# Patient Record
Sex: MALE | Race: WHITE | Employment: OTHER | URBAN - METROPOLITAN AREA
[De-identification: names, ages, dates, MRNs, and addresses within clinical notes are randomized per-mention and may not be internally consistent; named-entity substitution may affect disease eponyms.]

---

## 2017-04-10 ENCOUNTER — OFFICE VISIT (OUTPATIENT)
Dept: OTOLARYNGOLOGY | Facility: CLINIC | Age: 67
End: 2017-04-10

## 2017-04-10 VITALS — WEIGHT: 205 LBS | BODY MASS INDEX: 30.36 KG/M2 | HEIGHT: 69 IN

## 2017-04-10 DIAGNOSIS — C32.0 CANCER OF GLOTTIS (H): Primary | ICD-10-CM

## 2017-04-10 RX ORDER — CODEINE PHOSPHATE AND GUAIFENESIN 10; 100 MG/5ML; MG/5ML
1-2 SOLUTION ORAL EVERY 4 HOURS PRN
Qty: 250 ML | Refills: 1 | Status: SHIPPED | OUTPATIENT
Start: 2017-04-10 | End: 2017-12-18

## 2017-04-10 ASSESSMENT — PAIN SCALES - GENERAL: PAINLEVEL: NO PAIN (0)

## 2017-04-10 NOTE — PROGRESS NOTES
HISTORY OF PRESENT ILLNESS:  Mr. Garvin returns.  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  I did take him back to the operating room a couple times to laser away some granulation tissue at the anterior aspect of the closure.  Since that time, he has been doing fairly well.  His breathing seems to be stable although he does get short of breath when he is doing activities outside.  His wife says that he is not a complainer but she can see that he is getting shortness of breath when he does these things.  He denies any trouble swallowing and has no pain.      PHYSICAL EXAMINATION:  His weight is 205 pounds, up 1 pound from his last visit.  Examination of the oral cavity reveals normal mucosa of the tongue, floor of mouth, tongue base, hard and soft palate, gingival and buccal mucosa.  Palpation of floor of mouth, tongue and tongue base are normal.  He cannot tolerate mirror exam.  Examination of the neck reveals a prior surgery and radiation changes but no evidence of any lymphadenopathy.      PROCEDURE:  Flexible endoscopy is performed through the left nasal cavity after anesthetization and vasoconstriction using Collin-Synephrine and Xylocaine.  This reveals a normal nasopharynx, oropharynx and hypopharynx.  In viewing the larynx, again he has a horizontal slit-like opening for his glottis and the arytenoids are canted slightly forward.  As I tried to pass the scope into the subglottis, he coughs and I am unable to see this area.  The airway is moderately narrowed.      IMPRESSION:  No evidence of disease.      PLAN:  I would like the patient to meet with Dr. Cool to see if there are any in-office laser procedures he might be able to employ that might be able to open up his airway a little bit.  My concern is that if I take him to the operating room and put a breathing tube in, it will be difficult to access the  area that I actually need to laser so we will see if we can get an opinion from Dr. Cool. I will see the patient back in 6-8 months for oncologic surveillance.      cc: Prince Ring MD    [facility]    [address] [place]   [address]

## 2017-04-10 NOTE — LETTER
4/10/2017       RE: Goldy Garvin  [address]  [facility] [address]     Dear Colleague,    Thank you for referring your patient, Goldy Garvin, to the [facility] EAR NOSE AND THROAT at [facility]. Please see a copy of my visit note below.    HISTORY OF PRESENT ILLNESS:  Mr. Gravin returns.  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  I did take him back to the operating room a couple times to laser away some granulation tissue at the anterior aspect of the closure.  Since that time, he has been doing fairly well.  His breathing seems to be stable although he does get short of breath when he is doing activities outside.  His wife says that he is not a complainer but she can see that he is getting shortness of breath when he does these things.  He denies any trouble swallowing and has no pain.      PHYSICAL EXAMINATION:  His weight is 205 pounds, up 1 pound from his last visit.  Examination of the oral cavity reveals normal mucosa of the tongue, floor of mouth, tongue base, hard and soft palate, gingival and buccal mucosa.  Palpation of floor of mouth, tongue and tongue base are normal.  He cannot tolerate mirror exam.  Examination of the neck reveals a prior surgery and radiation changes but no evidence of any lymphadenopathy.      PROCEDURE:  Flexible endoscopy is performed through the left nasal cavity after anesthetization and vasoconstriction using Collin-Synephrine and Xylocaine.  This reveals a normal nasopharynx, oropharynx and hypopharynx.  In viewing the larynx, again he has a horizontal slit-like opening for his glottis and the arytenoids are canted slightly forward.  As I tried to pass the scope into the subglottis, he coughs and I am unable to see this area.  The airway is moderately narrowed.      IMPRESSION:  No evidence of disease.      PLAN:  I would like the patient  to meet with Dr. Cool to see if there are any in-office laser procedures he might be able to employ that might be able to open up his airway a little bit.  My concern is that if I take him to the operating room and put a breathing tube in, it will be difficult to access the area that I actually need to laser so we will see if we can get an opinion from Dr. Cool. I will see the patient back in 6-8 months for oncologic surveillance.      Again, thank you for allowing me to participate in the care of your patient.      Sincerely,    Trav Machado MD      cc: Prince Ring MD    [facility]    [address] [place]   [address]

## 2017-04-10 NOTE — MR AVS SNAPSHOT
After Visit Summary   4/10/2017    Goldy Garvin    MRN: [id number]           Patient Information     Date Of Birth          1950        Visit Information        Provider Department      4/10/2017 11:00 AM Trav Machado MD [facility] Ear Nose and Throat        Today's Diagnoses     Cancer of glottis (H)    -  1      Care Instructions    Appointment to be made with Dr. Cool  Follow up with Dr. Machado in 6 months  Call me If ?'s arise [phone]  Alyssa Nicole RN        Follow-ups after your visit        Who to contact     Please call your clinic at [phone] to:    Ask questions about your health    Make or cancel appointments    Discuss your medicines    Learn about your test results    Speak to your doctor   If you have compliments or concerns about an experience at your clinic, or if you wish to file a complaint, please contact [facility] Patient Relations at [phone] or email us at [email or web address]         Additional Information About Your Visit        MyChart Information     [employer] gives you secure access to your electronic health record. If you see a primary care provider, you can also send messages to your care team and make appointments. If you have questions, please call your primary care clinic.  If you do not have a primary care provider, please call [phone] and they will assist you.      [employer] is an electronic gateway that provides easy, online access to your medical records. With [employer], you can request a clinic appointment, read your test results, renew a prescription or communicate with your care team.     To access your existing account, please contact your [facility] or call [phone] for assistance.        Care EveryWhere ID     This is your Care EveryWhere ID. This could be used by other organizations to access your [place] medical records  [id number]        Your Vitals Were      "Height BMI (Body Mass Index)                1.75 m (5' 8.9\") 30.36 kg/m2           Blood Pressure from Last 3 Encounters:   03/20/14 138/80   09/24/13 130/84   08/06/13 138/62    Weight from Last 3 Encounters:   04/13/17 93.9 kg (207 lb)   04/10/17 93 kg (205 lb)   08/08/16 92.5 kg (204 lb)              We Performed the Following     LARYNGOSCOPY FLEX FIBEROPTIC, DIAGNOSTIC          Where to get your medicines      Some of these will need a paper prescription and others can be bought over the counter.  Ask your nurse if you have questions.     Bring a paper prescription for each of these medications     guaiFENesin-codeine 100-10 MG/5ML Soln solution          Primary Care Provider Office Phone # Fax #    Prince Ring [phone]       [facility] [address] [place] [address]        Thank you!     Thank you for choosing [facility] EAR NOSE AND THROAT  for your care. Our goal is always to provide you with excellent care. Hearing back from our patients is one way we can continue to improve our services. Please take a few minutes to complete the written survey that you may receive in the mail after your visit with us. Thank you!             Your Updated Medication List - Protect others around you: Learn how to safely use, store and throw away your medicines at www.disposemymeds.org.          This list is accurate as of: 4/10/17 11:59 PM.  Always use your most recent med list.                   Brand Name Dispense Instructions for use    ASPIRIN PO      Take 325 mg by mouth daily       atorvastatin 80 MG tablet    LIPITOR     40 mg by Per Feeding Tube route At Bedtime       guaiFENesin-codeine 100-10 MG/5ML Soln solution    CHERATUSSIN AC    250 mL    Take 5-10 mLs by mouth every 4 hours as needed for cough       LISINOPRIL PO      Take 10 mg by mouth daily       MULTI-VITAMINS PO      Take by mouth daily       POTASSIUM CHLORIDE ER PO      Take by mouth daily       PRILOSEC PO "

## 2017-04-10 NOTE — PATIENT INSTRUCTIONS
Appointment to be made with Dr. Cool  Follow up with Dr. Machado in 6 months  Call me If ?'s arise [phone]  Alyssa Nicole RN

## 2017-04-13 ENCOUNTER — OFFICE VISIT (OUTPATIENT)
Dept: OTOLARYNGOLOGY | Facility: CLINIC | Age: 67
End: 2017-04-13

## 2017-04-13 VITALS — HEIGHT: 68 IN | WEIGHT: 207 LBS | BODY MASS INDEX: 31.37 KG/M2

## 2017-04-13 DIAGNOSIS — R49.0 DYSPHONIA: Primary | ICD-10-CM

## 2017-04-13 RX ORDER — AMLODIPINE BESYLATE 2.5 MG/1
2.5 TABLET ORAL
COMMUNITY
Start: 2017-04-10

## 2017-04-13 RX ORDER — NITROGLYCERIN 0.4 MG/1
0.4 TABLET SUBLINGUAL
COMMUNITY
Start: 2017-04-10 | End: 2018-04-10

## 2017-04-13 ASSESSMENT — PAIN SCALES - GENERAL: PAINLEVEL: NO PAIN (0)

## 2017-04-13 NOTE — NURSING NOTE
Chief Complaint   Patient presents with     Consult     New Check airway, cancer of glottis      Pt states no pain today.    N Javier KELLYN

## 2017-04-13 NOTE — MR AVS SNAPSHOT
"              After Visit Summary   4/13/2017    Goldy Garvin    MRN: [id number]           Patient Information     Date Of Birth          1950        Visit Information        Provider Department      4/13/2017 3:30 PM Srikanth Cool MD [facility] Ear Nose and Throat        Today's Diagnoses     Dysphonia    -  1       Follow-ups after your visit        Follow-up notes from your care team     Return if symptoms worsen or fail to improve.      Who to contact     Please call your clinic at [phone] to:    Ask questions about your health    Make or cancel appointments    Discuss your medicines    Learn about your test results    Speak to your doctor   If you have compliments or concerns about an experience at your clinic, or if you wish to file a complaint, please contact [facility] Patient Relations at [phone] or email us at [email or web address]         Additional Information About Your Visit        MyChart Information     [email or web address] gives you secure access to your electronic health record. If you see a primary care provider, you can also send messages to your care team and make appointments. If you have questions, please call your primary care clinic.  If you do not have a primary care provider, please call [phone] and they will assist you.      [employer] is an electronic gateway that provides easy, online access to your medical records. With [employer], you can request a clinic appointment, read your test results, renew a prescription or communicate with your care team.     To access your existing account, please contact your [facility] or call [phone] for assistance.        Care EveryWhere ID     This is your Care EveryWhere ID. This could be used by other organizations to access your [place] medical records  [id number]        Your Vitals Were     Height BMI (Body Mass Index)                1.735 m (5' 8.31\") 31.19 " kg/m2           Blood Pressure from Last 3 Encounters:   03/20/14 138/80   09/24/13 130/84   08/06/13 138/62    Weight from Last 3 Encounters:   04/13/17 93.9 kg (207 lb)   04/10/17 93 kg (205 lb)   08/08/16 92.5 kg (204 lb)              We Performed the Following     IMAGESTREAM RECORDING ORDER     LARYNGOSCOPY FLEX FIBEROPTIC, DIAGNOSTIC        Primary Care Provider Office Phone # Fax #    Prince Ring [phone]       [facility] [address] [place] [address]        Thank you!     Thank you for choosing [facility] EAR NOSE AND THROAT  for your care. Our goal is always to provide you with excellent care. Hearing back from our patients is one way we can continue to improve our services. Please take a few minutes to complete the written survey that you may receive in the mail after your visit with us. Thank you!             Your Updated Medication List - Protect others around you: Learn how to safely use, store and throw away your medicines at www.disposemymeds.org.          This list is accurate as of: 4/13/17 11:59 PM.  Always use your most recent med list.                   Brand Name Dispense Instructions for use    amLODIPine 2.5 MG tablet    NORVASC     Take 2.5 mg by mouth       ASPIRIN PO      Take 325 mg by mouth daily       atorvastatin 80 MG tablet    LIPITOR     40 mg by Per Feeding Tube route At Bedtime       guaiFENesin-codeine 100-10 MG/5ML Soln solution    CHERATUSSIN AC    250 mL    Take 5-10 mLs by mouth every 4 hours as needed for cough       LISINOPRIL PO      Take 10 mg by mouth daily       MULTI-VITAMINS PO      Take by mouth daily       nitroglycerin 0.4 MG sublingual tablet    NITROSTAT     Place 0.4 mg under the tongue       POTASSIUM CHLORIDE ER PO      Take by mouth daily       PRILOSEC PO

## 2017-04-13 NOTE — PROGRESS NOTES
HISTORY OF PRESENT ILLNESS:  Mr. Garvin is a patient of Dr. Machado.  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  He has been taken to the operating room twice to laser out granulation tissue at the anterior aspect of the closure.  His breathing seems to be stable.  He is able to walk up a flight of stairs and he is quite active.  He notes that he will have times when he is short of breath and will have mild difficulty breathing, but this is only with exertion rather than rest.  Again, he is quite active.  He has no swallowing difficulties and his voice is consistent with his surgery.         Last 2 Scores for Patient-Answered VHI Questionnaire  VHI Total Score 4/13/2017   VHI Total Score 14       Last 2 Scores for Patient-Answered CSI Questionnaire  CSI Total Score 4/13/2017   CSI Total Score 5         Last 2 Scores for Patient-Answered EAT Questionnaire  EAT Total Score 4/13/2017   EAT Total Score 8           PAST MEDICAL HISTORY:   Past Medical History:   Diagnosis Date     Arthritis     in hands     COPD (chronic obstructive pulmonary disease) (H)      Coronary artery disease      Gastro-oesophageal reflux disease      Heart attack (H) 6/2002     History of radiation therapy      Hypertension      Migraines      Tinnitus        PAST SURGICAL HISTORY:   Past Surgical History:   Procedure Laterality Date     BIOPSY  3/2013    larynx     CARDIAC SURGERY      stent placed     COLONOSCOPY      2012     HERNIA REPAIR       LARYNGECTOMY  4/30/2013    Procedure: LARYNGECTOMY;  Direct Laryngoscopy with Biopsies, Supra-Cricoid with Tracheostomy Tube placement and 12fr nasal feeding tube placement;  Surgeon: Trav Machado MD;  Location: [facility]     LARYNGOSCOPY WITH BIOPSY(IES)  4/30/2013    Procedure: LARYNGOSCOPY WITH BIOPSY(IES);;  Surgeon: Trav Machado MD;  Location: [facility] OR     LASER CO2 LARYNGOSCOPY   4/18/2013    Procedure: LASER CO2 LARYNGOSCOPY;  Direct Laryngosocpy, Co2 Excison Laryngeal Lesion , with Biopsies;  Surgeon: Trav Machado MD;  Location: [facility] OR     LASER CO2 LARYNGOSCOPY  8/6/2013    Procedure: LASER CO2 LARYNGOSCOPY;  Suspension Micro Laryngoscopy With CO2 Laser Of Laryngeal Tissue ;  Surgeon: Trav Machado MD;  Location: [facility] OR     LASER CO2 LARYNGOSCOPY  9/24/2013    Procedure: LASER CO2 LARYNGOSCOPY;  Direct Laryngoscopy with Microscope, Co2 Laser Excision Tracheal Granulation Tissue;  Surgeon: Trav Machado MD;  Location: [facility] OR     LASER CO2 LARYNGOSCOPY, COMPLEX  3/20/2014    Procedure: LASER CO2 LARYNGOSCOPY, COMPLEX;  Suspension Micro Laryngoscopy, Diagnostic Laryngoscopy, Co2 Laser Excision of Laryngeal Lesions, Mitomycin application;  Surgeon: Trav Machado MD;  Location: [facility] OR       FAMILY HISTORY:   Family History   Problem Relation Age of Onset     CANCER Mother      DIABETES Mother      DIABETES Sister      HEART DISEASE Father      HEART DISEASE Sister      Hypertension Father      Hypertension Sister      CEREBROVASCULAR DISEASE Father      Migraines Sister        SOCIAL HISTORY:   Social History   Substance Use Topics     Smoking status: Former Smoker     Packs/day: 0.50     Years: 5.00     Types: Cigarettes, Cigars     Quit date: 1/18/1975     Smokeless tobacco: Never Used     Alcohol use Yes      Comment: very little       REVIEW OF SYSTEMS: Ten point review of systems was performed and is negative except for:   [facility] E[facility] ROS 4/13/2017   Constitutional Appetite change, Unexplained fatigue   Ears, Nose, Throat Ringing/noise in ears, Trouble swallowing   Cardiopulmonary Breathing problems, Wheezing   Gastrointestinal/Genitourinary Heartburn/indigestion   Musculoskeletal Back pain   Allergy/Immunology Allergies or hay fever        ALLERGIES: Seafood; Blueberry flavor; Clam shell; and Amoxicillin    MEDICATIONS:   Current Outpatient Prescriptions   Medication Sig  Dispense Refill     nitroglycerin (NITROSTAT) 0.4 MG sublingual tablet Place 0.4 mg under the tongue       amLODIPine (NORVASC) 2.5 MG tablet Take 2.5 mg by mouth       guaiFENesin-codeine (CHERATUSSIN AC) 100-10 MG/5ML SOLN solution Take 5-10 mLs by mouth every 4 hours as needed for cough 250 mL 1     LISINOPRIL PO Take 10 mg by mouth daily       Multiple Vitamin (MULTI-VITAMINS PO) Take by mouth daily       Omeprazole (PRILOSEC PO)        POTASSIUM CHLORIDE ER PO Take by mouth daily       ASPIRIN PO Take 325 mg by mouth daily       atorvastatin (LIPITOR) 80 MG tablet 40 mg by Per Feeding Tube route At Bedtime                  PHYSICAL EXAMINATION:  He is awake, alert, in no apparent distress.  Tympanic membranes intact bilaterally.  Nasal exam shows a mild septal deviation, without obstruction.  Examination of the oral cavity shows no suspicious lesions.  His neck shows a well-healed surgical incision and radiation changes but no significant adenopathy.  Pulse is regular.  Upper airway is clear.  Cranial nerves II-XII are grossly intact.          Fiberoptic laryngoscopy was performed following topical anesthesia with 3% lidocaine and 0.25% phenylephrine.  Scope was passed through the right nostril.  This showed post-surgical changes, therefore, a coronal orientation of his glottic airway is present.  There appears to be an adequate airway, epiglottis and arytenoid structures are intact.  With deep inspiration, there is intermittent narrowing of the airway and very brief collapse of the epiglottis against the arytenoids resulting in a decreased airway.  This quickly corrects.  However, when working on abdominal breathing and relaxation of the larynx he does have subjectively improved airflow and less collapse of his supraglottic larynx.  No granulation tissue, no ulcerations are seen.  The immediate subglottic area appears quite clear.  The hypopharynx is poorly seen because of collapse of the pharyngeal wall, but  no suspicious lesions are present.      IMPRESSION AND PLAN:  He does have an adequate airway for moderate activity.  I believe with some breathing exercises he could improve his activity tolerance which would be helpful as he is quite active at home. He wished to work on this on his own for now.  He was given a photograph of his airway, both in the open and closed positions.  All questions were answered.  If they would like to have a session with our therapist they can call us and we will help arrange this for them.        cc: Prince Armstrong MD           [facility]          [address] [place] [address]                     Trav Machado MD           [facility] [address]              GG/lz         Last 2 Scores for Patient-Answered VHI Questionnaire  VHI Total Score 4/13/2017   VHI Total Score 14       Last 2 Scores for Patient-Answered CSI Questionnaire  CSI Total Score 4/13/2017   CSI Total Score 5         Last 2 Scores for Patient-Answered EAT Questionnaire  EAT Total Score 4/13/2017   EAT Total Score 8           PAST MEDICAL HISTORY:   Past Medical History:   Diagnosis Date     Arthritis     in hands     COPD (chronic obstructive pulmonary disease) (H)      Coronary artery disease      Gastro-oesophageal reflux disease      Heart attack (H) 6/2002     History of radiation therapy      Hypertension      Migraines      Tinnitus        PAST SURGICAL HISTORY:   Past Surgical History:   Procedure Laterality Date     BIOPSY  3/2013    larynx     CARDIAC SURGERY      stent placed     COLONOSCOPY      2012     HERNIA REPAIR       LARYNGECTOMY  4/30/2013    Procedure: LARYNGECTOMY;  Direct Laryngoscopy with Biopsies, Supra-Cricoid with Tracheostomy Tube placement and 12fr nasal feeding tube placement;  Surgeon: Trav Machado MD;  Location:  OR     LARYNGOSCOPY WITH BIOPSY(IES)  4/30/2013    Procedure: LARYNGOSCOPY WITH BIOPSY(IES);;  Surgeon: Trav Machado MD;  Location:   OR     LASER CO2 LARYNGOSCOPY  4/18/2013    Procedure: LASER CO2 LARYNGOSCOPY;  Direct Laryngosocpy, Co2 Excison Laryngeal Lesion , with Biopsies;  Surgeon: Trav Machado MD;  [facility]     LASER CO2 LARYNGOSCOPY  8/6/2013    Procedure: LASER CO2 LARYNGOSCOPY;  Suspension Micro Laryngoscopy With CO2 Laser Of Laryngeal Tissue ;  Surgeon: Trav Machado MD;  [facility]     LASER CO2 LARYNGOSCOPY  9/24/2013    Procedure: LASER CO2 LARYNGOSCOPY;  Direct Laryngoscopy with Microscope, Co2 Laser Excision Tracheal Granulation Tissue;  Surgeon: Trav Machado MD;  [facility]: [facility]     LASER CO2 LARYNGOSCOPY, COMPLEX  3/20/2014    Procedure: LASER CO2 LARYNGOSCOPY, COMPLEX;  Suspension Micro Laryngoscopy, Diagnostic Laryngoscopy, Co2 Laser Excision of Laryngeal Lesions, Mitomycin application;  Surgeon: Trav Machado MD;  Location: [facility]       FAMILY HISTORY:   Family History   Problem Relation Age of Onset     CANCER Mother      DIABETES Mother      DIABETES Sister      HEART DISEASE Father      HEART DISEASE Sister      Hypertension Father      Hypertension Sister      CEREBROVASCULAR DISEASE Father      Migraines Sister        SOCIAL HISTORY:   Social History   Substance Use Topics     Smoking status: Former Smoker     Packs/day: 0.50     Years: 5.00     Types: Cigarettes, Cigars     Quit date: 1/18/1975     Smokeless tobacco: Never Used     Alcohol use Yes      Comment: very little       REVIEW OF SYSTEMS: Ten point review of systems was performed and is negative except for:   [facility] E[facility] ROS 4/13/2017   Constitutional Appetite change, Unexplained fatigue   Ears, Nose, Throat Ringing/noise in ears, Trouble swallowing   Cardiopulmonary Breathing problems, Wheezing   Gastrointestinal/Genitourinary Heartburn/indigestion   Musculoskeletal Back pain   Allergy/Immunology Allergies or hay fever        ALLERGIES: Seafood; Blueberry flavor; Clam shell; and Amoxicillin    MEDICATIONS:   Current Outpatient  Prescriptions   Medication Sig Dispense Refill     nitroglycerin (NITROSTAT) 0.4 MG sublingual tablet Place 0.4 mg under the tongue       amLODIPine (NORVASC) 2.5 MG tablet Take 2.5 mg by mouth       guaiFENesin-codeine (CHERATUSSIN AC) 100-10 MG/5ML SOLN solution Take 5-10 mLs by mouth every 4 hours as needed for cough 250 mL 1     LISINOPRIL PO Take 10 mg by mouth daily       Multiple Vitamin (MULTI-VITAMINS PO) Take by mouth daily       Omeprazole (PRILOSEC PO)        POTASSIUM CHLORIDE ER PO Take by mouth daily       ASPIRIN PO Take 325 mg by mouth daily       atorvastatin (LIPITOR) 80 MG tablet 40 mg by Per Feeding Tube route At Bedtime             Open, relaxed position        Most extreme closed position.  Occurs for a very brief time.

## 2017-04-13 NOTE — LETTER
4/13/2017       RE: Goldy Garvin  [address]  [facility] [address]     Dear Colleague,    Thank you for referring your patient, Goldy Garvin, to the [facility] EAR NOSE AND THROAT at [facility]. Please see a copy of my visit note below.    HISTORY OF PRESENT ILLNESS:  Mr. Garvin is a patient of Dr. Machado.  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  He has been taken to the operating room twice to laser out granulation tissue at the anterior aspect of the closure.  His breathing seems to be stable.  He is able to walk up a flight of stairs and he is quite active.  He notes that he will have times when he is short of breath and will have mild difficulty breathing, but this is only with exertion rather than rest.  Again, he is quite active.  He has no swallowing difficulties and his voice is consistent with his surgery.         Last 2 Scores for Patient-Answered VHI Questionnaire  VHI Total Score 4/13/2017   VHI Total Score 14       Last 2 Scores for Patient-Answered CSI Questionnaire  CSI Total Score 4/13/2017   CSI Total Score 5         Last 2 Scores for Patient-Answered EAT Questionnaire  EAT Total Score 4/13/2017   EAT Total Score 8           PAST MEDICAL HISTORY:   Past Medical History:   Diagnosis Date     Arthritis     in hands     COPD (chronic obstructive pulmonary disease) (H)      Coronary artery disease      Gastro-oesophageal reflux disease      Heart attack (H) 6/2002     History of radiation therapy      Hypertension      Migraines      Tinnitus        PAST SURGICAL HISTORY:   Past Surgical History:   Procedure Laterality Date     BIOPSY  3/2013    larynx     CARDIAC SURGERY      stent placed     COLONOSCOPY      2012     HERNIA REPAIR       LARYNGECTOMY  4/30/2013    Procedure: LARYNGECTOMY;  Direct Laryngoscopy with Biopsies, Supra-Cricoid with Tracheostomy Tube  placement and 12fr nasal feeding tube placement;  Surgeon: Trav Machado MD;  [facility]     LARYNGOSCOPY WITH BIOPSY(IES)  4/30/2013    Procedure: LARYNGOSCOPY WITH BIOPSY(IES);;  Surgeon: Trav Machado MD;  [facility]     LASER CO2 LARYNGOSCOPY  4/18/2013    Procedure: LASER CO2 LARYNGOSCOPY;  Direct Laryngosocpy, Co2 Excison Laryngeal Lesion , with Biopsies;  Surgeon: Trav Machado MD;  [facility]     LASER CO2 LARYNGOSCOPY  8/6/2013    Procedure: LASER CO2 LARYNGOSCOPY;  Suspension Micro Laryngoscopy With CO2 Laser Of Laryngeal Tissue ;  Surgeon: Trav Machado MD;  [facility]     LASER CO2 LARYNGOSCOPY  9/24/2013    Procedure: LASER CO2 LARYNGOSCOPY;  Direct Laryngoscopy with Microscope, Co2 Laser Excision Tracheal Granulation Tissue;  Surgeon: Trav Machado MD;  [facility]     LASER CO2 LARYNGOSCOPY, COMPLEX  3/20/2014    Procedure: LASER CO2 LARYNGOSCOPY, COMPLEX;  Suspension Micro Laryngoscopy, Diagnostic Laryngoscopy, Co2 Laser Excision of Laryngeal Lesions, Mitomycin application;  Surgeon: Trav Machado MD;  [facility]       FAMILY HISTORY:   Family History   Problem Relation Age of Onset     CANCER Mother      DIABETES Mother      DIABETES Sister      HEART DISEASE Father      HEART DISEASE Sister      Hypertension Father      Hypertension Sister      CEREBROVASCULAR DISEASE Father      Migraines Sister        SOCIAL HISTORY:   Social History   Substance Use Topics     Smoking status: Former Smoker     Packs/day: 0.50     Years: 5.00     Types: Cigarettes, Cigars     Quit date: 1/18/1975     Smokeless tobacco: Never Used     Alcohol use Yes      Comment: very little       REVIEW OF SYSTEMS: Ten point review of systems was performed and is negative except for:   [facility] ROS 4/13/2017   Constitutional Appetite change, Unexplained fatigue   Ears, Nose, Throat Ringing/noise in ears, Trouble swallowing   Cardiopulmonary Breathing problems,  Wheezing   Gastrointestinal/Genitourinary Heartburn/indigestion   Musculoskeletal Back pain   Allergy/Immunology Allergies or hay fever        ALLERGIES: Seafood; Blueberry flavor; Clam shell; and Amoxicillin    MEDICATIONS:   Current Outpatient Prescriptions   Medication Sig Dispense Refill     nitroglycerin (NITROSTAT) 0.4 MG sublingual tablet Place 0.4 mg under the tongue       amLODIPine (NORVASC) 2.5 MG tablet Take 2.5 mg by mouth       guaiFENesin-codeine (CHERATUSSIN AC) 100-10 MG/5ML SOLN solution Take 5-10 mLs by mouth every 4 hours as needed for cough 250 mL 1     LISINOPRIL PO Take 10 mg by mouth daily       Multiple Vitamin (MULTI-VITAMINS PO) Take by mouth daily       Omeprazole (PRILOSEC PO)        POTASSIUM CHLORIDE ER PO Take by mouth daily       ASPIRIN PO Take 325 mg by mouth daily       atorvastatin (LIPITOR) 80 MG tablet 40 mg by Per Feeding Tube route At Bedtime                  PHYSICAL EXAMINATION:  He is awake, alert, in no apparent distress.  Tympanic membranes intact bilaterally.  Nasal exam shows a mild septal deviation, without obstruction.  Examination of the oral cavity shows no suspicious lesions.  His neck shows a well-healed surgical incision and radiation changes but no significant adenopathy.  Pulse is regular.  Upper airway is clear.  Cranial nerves II-XII are grossly intact.          Fiberoptic laryngoscopy was performed following topical anesthesia with 3% lidocaine and 0.25% phenylephrine.  Scope was passed through the right nostril.  This showed post-surgical changes, therefore, a coronal orientation of his glottic airway is present.  There appears to be an adequate airway, epiglottis and arytenoid structures are intact.  With deep inspiration, there is intermittent narrowing of the airway and very brief collapse of the epiglottis against the arytenoids resulting in a decreased airway.  This quickly corrects.  However, when working on abdominal breathing and relaxation of the  larynx he does have subjectively improved airflow and less collapse of his supraglottic larynx.  No granulation tissue, no ulcerations are seen.  The immediate subglottic area appears quite clear.  The hypopharynx is poorly seen because of collapse of the pharyngeal wall, but no suspicious lesions are present.      IMPRESSION AND PLAN:  He does have an adequate airway for moderate activity.  I believe with some breathing exercises he could improve his activity tolerance which would be helpful as he is quite active at home. He wished to work on this on his own for now.  He was given a photograph of his airway, both in the open and closed positions.  All questions were answered.  If they would like to have a session with our therapist they can call us and we will help arrange this for them.        cc: Prince Armstrong MD           [facility]          [address] [place] [address]                     Trav Machado MD           [facility] [address]              GG/lz         Last 2 Scores for Patient-Answered VHI Questionnaire  VHI Total Score 4/13/2017   VHI Total Score 14       Last 2 Scores for Patient-Answered CSI Questionnaire  CSI Total Score 4/13/2017   CSI Total Score 5         Last 2 Scores for Patient-Answered EAT Questionnaire  EAT Total Score 4/13/2017   EAT Total Score 8           PAST MEDICAL HISTORY:   Past Medical History:   Diagnosis Date     Arthritis     in hands     COPD (chronic obstructive pulmonary disease) (H)      Coronary artery disease      Gastro-oesophageal reflux disease      Heart attack (H) 6/2002     History of radiation therapy      Hypertension      Migraines      Tinnitus        PAST SURGICAL HISTORY:   Past Surgical History:   Procedure Laterality Date     BIOPSY  3/2013    larynx     CARDIAC SURGERY      stent placed     COLONOSCOPY      2012     HERNIA REPAIR       LARYNGECTOMY  4/30/2013    Procedure: LARYNGECTOMY;  Direct Laryngoscopy with  Biopsies, Supra-Cricoid with Tracheostomy Tube placement and 12fr nasal feeding tube placement;  Surgeon: Trav Machado MD;  [facility]     LARYNGOSCOPY WITH BIOPSY(IES)  4/30/2013    Procedure: LARYNGOSCOPY WITH BIOPSY(IES);;  Surgeon: Trav Machado MD;  [facility]     LASER CO2 LARYNGOSCOPY  4/18/2013    Procedure: LASER CO2 LARYNGOSCOPY;  Direct Laryngosocpy, Co2 Excison Laryngeal Lesion , with Biopsies;  Surgeon: Trav Machado MD;  [facility]     LASER CO2 LARYNGOSCOPY  8/6/2013    Procedure: LASER CO2 LARYNGOSCOPY;  Suspension Micro Laryngoscopy With CO2 Laser Of Laryngeal Tissue ;  Surgeon: Trav Machado MD;  [facility]     LASER CO2 LARYNGOSCOPY  9/24/2013    Procedure: LASER CO2 LARYNGOSCOPY;  Direct Laryngoscopy with Microscope, Co2 Laser Excision Tracheal Granulation Tissue;  Surgeon: Trav Machado MD;  [facility]     LASER CO2 LARYNGOSCOPY, COMPLEX  3/20/2014    Procedure: LASER CO2 LARYNGOSCOPY, COMPLEX;  Suspension Micro Laryngoscopy, Diagnostic Laryngoscopy, Co2 Laser Excision of Laryngeal Lesions, Mitomycin application;  Surgeon: Trav Machado MD;  [facility]       FAMILY HISTORY:   Family History   Problem Relation Age of Onset     CANCER Mother      DIABETES Mother      DIABETES Sister      HEART DISEASE Father      HEART DISEASE Sister      Hypertension Father      Hypertension Sister      CEREBROVASCULAR DISEASE Father      Migraines Sister        SOCIAL HISTORY:   Social History   Substance Use Topics     Smoking status: Former Smoker     Packs/day: 0.50     Years: 5.00     Types: Cigarettes, Cigars     Quit date: 1/18/1975     Smokeless tobacco: Never Used     Alcohol use Yes      Comment: very little       REVIEW OF SYSTEMS: Ten point review of systems was performed and is negative except for:   [facility] ROS 4/13/2017   Constitutional Appetite change, Unexplained fatigue   Ears, Nose, Throat Ringing/noise in ears, Trouble  swallowing   Cardiopulmonary Breathing problems, Wheezing   Gastrointestinal/Genitourinary Heartburn/indigestion   Musculoskeletal Back pain   Allergy/Immunology Allergies or hay fever        ALLERGIES: Seafood; Blueberry flavor; Clam shell; and Amoxicillin    MEDICATIONS:   Current Outpatient Prescriptions   Medication Sig Dispense Refill     nitroglycerin (NITROSTAT) 0.4 MG sublingual tablet Place 0.4 mg under the tongue       amLODIPine (NORVASC) 2.5 MG tablet Take 2.5 mg by mouth       guaiFENesin-codeine (CHERATUSSIN AC) 100-10 MG/5ML SOLN solution Take 5-10 mLs by mouth every 4 hours as needed for cough 250 mL 1     LISINOPRIL PO Take 10 mg by mouth daily       Multiple Vitamin (MULTI-VITAMINS PO) Take by mouth daily       Omeprazole (PRILOSEC PO)        POTASSIUM CHLORIDE ER PO Take by mouth daily       ASPIRIN PO Take 325 mg by mouth daily       atorvastatin (LIPITOR) 80 MG tablet 40 mg by Per Feeding Tube route At Bedtime             Open, relaxed position        Most extreme closed position.  Occurs for a very brief time.        Again, thank you for allowing me to participate in the care of your patient.      Sincerely,    Srikanth Cool MD

## 2017-12-18 ENCOUNTER — OFFICE VISIT (OUTPATIENT)
Dept: OTOLARYNGOLOGY | Facility: CLINIC | Age: 67
End: 2017-12-18
Payer: MEDICARE

## 2017-12-18 ENCOUNTER — ALLIED HEALTH/NURSE VISIT (OUTPATIENT)
Dept: SPEECH THERAPY | Facility: CLINIC | Age: 67
End: 2017-12-18

## 2017-12-18 ENCOUNTER — RADIANT APPOINTMENT (OUTPATIENT)
Dept: CT IMAGING | Facility: CLINIC | Age: 67
End: 2017-12-18
Attending: OTOLARYNGOLOGY
Payer: MEDICARE

## 2017-12-18 ENCOUNTER — CARE COORDINATION (OUTPATIENT)
Dept: OTOLARYNGOLOGY | Facility: CLINIC | Age: 67
End: 2017-12-18

## 2017-12-18 VITALS — BODY MASS INDEX: 30.31 KG/M2 | HEIGHT: 68 IN | WEIGHT: 200 LBS

## 2017-12-18 DIAGNOSIS — C32.9 LARYNGEAL CANCER (H): Primary | ICD-10-CM

## 2017-12-18 DIAGNOSIS — C32.9 LARYNGEAL CANCER (H): ICD-10-CM

## 2017-12-18 DIAGNOSIS — R47.02 DYSPHASIA: ICD-10-CM

## 2017-12-18 LAB
CREAT BLD-MCNC: 1 MG/DL (ref 0.66–1.25)
GFR SERPL CREATININE-BSD FRML MDRD: 74 ML/MIN/1.7M2

## 2017-12-18 RX ORDER — IOPAMIDOL 755 MG/ML
100 INJECTION, SOLUTION INTRAVASCULAR ONCE
Status: COMPLETED | OUTPATIENT
Start: 2017-12-18 | End: 2017-12-18

## 2017-12-18 RX ADMIN — IOPAMIDOL 100 ML: 755 INJECTION, SOLUTION INTRAVASCULAR at 10:21

## 2017-12-18 ASSESSMENT — PAIN SCALES - GENERAL: PAINLEVEL: NO PAIN (0)

## 2017-12-18 NOTE — PATIENT INSTRUCTIONS
Follow up with Dr. Machado in 4-6 months  Ct neck to be done today  You will be called with the results  Swallow study to be scheduled  Call me if ?'s arise [phone]  Alyssa Nicole RN

## 2017-12-18 NOTE — LETTER
12/18/2017       RE: Goldy Garvin  [address]  [place] [address]     Dear Colleague,    Thank you for referring your patient, Goldy Garvin, to the [facility] EAR NOSE AND THROAT at [facility]. Please see a copy of my visit note below.    HISTORY OF PRESENT ILLNESS:  Mr. Garvin returns.  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  I did have to take him to the operating room a couple of times afterwards to laser away some scar tissue and granulation tissue at the anterior aspect of his closure.  Over the last couple of years, things have stabilized fairly well.  His breathing has been stable.  He comes in today a little bit early with the concern that he feels like things are a little bit tighter when he swallows.  He denies any problems with odynophagia or otalgia nor has he had any hemoptysis.  He says this has been going on for a few weeks, and he did have a sore throat for a little while.  He does not have any other upper respiratory symptoms.      PHYSICAL EXAMINATION:  On examination today, he is well appearing and in no distress.  Examination of the oral cavity reveals normal mucosa of the tongue, floor of mouth, hard and soft palate, gingival and buccal mucosa, retromolar trigone and posterior pharyngeal wall.  Palpation of floor of mouth, tongue and base of tongue are negative.  He cannot tolerate mirror exam.  Examination of the neck reveals no mass or lymphadenopathy but well-healed incisions.      PROCEDURE:  Flexible endoscopy is performed through the right nasal cavity after anesthetization and vasoconstriction using Collin-Synephrine and Xylocaine.  This reveals normal nasopharynx and oropharynx.  On viewing the hypopharynx and larynx, he again has a horizontal slit-like opening for his glottis, and his arytenoids are slightly canted forward, but  I do not see any lesions present today.  The posterior wall looks entirely normal.      IMPRESSION:  No evidence of disease, new sensation of tightness on swallowing.      PLAN:   1.  I would like to get a CT scan of the neck and also have him get a swallow study with Speech just to ensure that there is no stenosis farther down or any other lesion inferior to the hypopharynx that I am unable to see today.  We will call him with the results of the scan.       Again, thank you for allowing me to participate in the care of your patient.      Sincerely,    Trav Machado MD     cc: Prince Ring MD    [facility] [address] [place]   [address]

## 2017-12-18 NOTE — NURSING NOTE
"Chief Complaint   Patient presents with     RECHECK     Follow up having issues with swollowing      Height 1.727 m (5' 8\"), weight 90.7 kg (200 lb).    Bigg Simpson    "

## 2017-12-18 NOTE — MR AVS SNAPSHOT
After Visit Summary   12/18/2017    Goldy Garvin    MRN: [id number]           Patient Information     Date Of Birth          1950        Visit Information        Provider Department      12/18/2017 10:10 AM Kim Doyle SLP [facility]         Follow-ups after your visit        Your next 10 appointments already scheduled     Dec 18, 2017  3:20 PM CST   (Arrive by 3:05 PM)   CT SOFT TISSUE NECK W CONTRAST with UCCT1   [facility] ([facility])    [address] [address] [phone]           Please bring any scans or X-rays taken at other hospitals, if similar tests were done. Also bring a list of your medicines, including vitamins, minerals and over-the-counter drugs. It is safest to leave personal items at home.  Be sure to tell your doctor:   If you have any allergies.   If there s any chance you are pregnant.   If you are breastfeeding.   If you have any special needs.  You will have contrast for this exam. To prepare:   Do not eat or drink for 2 hours before your exam. If you need to take medicine, you may take it with small sips of water. (We may ask you to take liquid medicine as well.)   The day before your exam, drink extra fluids at least six 8-ounce glasses (unless your doctor tells you to restrict your fluids).  Patients over 70 or patients with diabetes or kidney problems:   If you haven t had a blood test (creatinine test) within the last 30 days, go to your clinic or Diagnostic Imaging Department for this test.  If you have diabetes:   If your kidney function is normal, continue taking your metformin (Avandamet, Glucophage, Glucovance, Metaglip) on the day of your exam.   If your kidney function is abnormal, wait 48 hours before restarting this medicine.  Please wear loose clothing, such as a sweat suit or jogging clothes. Avoid snaps, zippers and other metal. We may ask you to undress and put on  a hospital gown.  If you have any questions, please call the Imaging Department where you will have your exam.            Dec 28, 2017  9:00 AM CST   Video Swallow with RIKCY Moy   [facility] ([facility])    [address] [place], [place] [id number] [phone]           Please check in for this visit in Imaging on the 1st floor.            Dec 28, 2017  9:00 AM CST   XR VIDEO SPEECH EVALUATION WITH ESOPHAGRAM with [id number] GIGU RAD   [facility] ([facility])    [address] [place] [address] [phone]           Please bring a list of your current medicines to your exam. (Include vitamins, minerals and over-the-counter medicines.) Leave your valuables at home.  Tell the doctor if there is a chance you could be pregnant.  Do not eat for 4 hours before the exam. Keep drinking clear liquids until 2 hours before the exam.  You may take pain medicine (with a sip of water) up to 4 hours before the exam.  Do not swallow any other medicines unless your doctor tells you to. Talk to your doctor to be sure it s safe to stop your medicines.  Please call the Imaging Department at your exam site with any questions.            Apr 09, 2018 10:00 AM CDT   (Arrive by 9:45 AM)   RETURN TUMOR VISIT with Trav Machado MD   [facility] ([facility])    [address] [place] [address] [phone]              Future tests that were ordered for you today     Open Future Orders        Priority Expected Expires Ordered    XR Video Swallow w Esophagram Routine 12/18/2017 12/18/2018 12/18/2017            Who to contact     Please call your clinic at [phone] to:    Ask questions about your health    Make or cancel appointments    Discuss your medicines    Learn about your test results    Speak to your doctor   If you have  compliments or concerns about an experience at your clinic, or if you wish to file a complaint, please contact [facility] Patient Relations at [phone] or email us at [email or web address]         Additional Information About Your Visit        MyChart Information     [employer] gives you secure access to your electronic health record. If you see a primary care provider, you can also send messages to your care team and make appointments. If you have questions, please call your primary care clinic.  If you do not have a primary care provider, please call [phone] and they will assist you.      [employer] is an electronic gateway that provides easy, online access to your medical records. With [employer], you can request a clinic appointment, read your test results, renew a prescription or communicate with your care team.     To access your existing account, please contact your [facility] Clinic or call [phone] for assistance.        Care EveryWhere ID     This is your Care EveryWhere ID. This could be used by other organizations to access your [facility] medical records  [id number]         Blood Pressure from Last 3 Encounters:   03/20/14 138/80   09/24/13 130/84   08/06/13 138/62    Weight from Last 3 Encounters:   12/18/17 90.7 kg (200 lb)   04/13/17 93.9 kg (207 lb)   04/10/17 93 kg (205 lb)              Today, you had the following     No orders found for display       Primary Care Provider Office Phone # Fax #    Prince Ring [phone]       [facility] [address] [place] [address]        Equal Access to Services     ANNA MARIE DYE : Hadii aad ku hadasho Soomaali, waaxda luqadaha, qaybta kaalmada adeegyada, mk campos. So [facility] [phone].    ATENCIÓN: Si habla español, tiene a soriano disposición servicios gratuitos de asistencia lingüística. Llame al [phone].    We comply  with applicable federal civil rights laws and [place] laws. We do not discriminate on the basis of race, color, national origin, age, disability, sex, sexual orientation, or gender identity.            Thank you!     Thank you for choosing [facility]  for your care. Our goal is always to provide you with excellent care. Hearing back from our patients is one way we can continue to improve our services. Please take a few minutes to complete the written survey that you may receive in the mail after your visit with us. Thank you!             Your Updated Medication List - Protect others around you: Learn how to safely use, store and throw away your medicines at www.disposemymeds.org.          This list is accurate as of: 12/18/17 10:12 AM.  Always use your most recent med list.                   Brand Name Dispense Instructions for use Diagnosis    amLODIPine 2.5 MG tablet    NORVASC     Take 2.5 mg by mouth        ASPIRIN PO      Take 162 mg by mouth daily        atorvastatin 80 MG tablet    LIPITOR     20 mg by Per Feeding Tube route 4 times daily        FISH OIL PO           LISINOPRIL PO      Take 10 mg by mouth daily        MULTI-VITAMINS PO      Take by mouth daily        nitroGLYcerin 0.4 MG sublingual tablet    NITROSTAT     Place 0.4 mg under the tongue        POTASSIUM CHLORIDE ER PO      Take 10 mg by mouth 2 times daily        PRILOSEC PO      Take 20 mg by mouth 2 times daily (before meals)

## 2017-12-18 NOTE — PROGRESS NOTES
HISTORY OF PRESENT ILLNESS:  Mr. Garvin returns.  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  I did have to take him to the operating room a couple of times afterwards to laser away some scar tissue and granulation tissue at the anterior aspect of his closure.  Over the last couple of years, things have stabilized fairly well.  His breathing has been stable.  He comes in today a little bit early with the concern that he feels like things are a little bit tighter when he swallows.  He denies any problems with odynophagia or otalgia nor has he had any hemoptysis.  He says this has been going on for a few weeks, and he did have a sore throat for a little while.  He does not have any other upper respiratory symptoms.      PHYSICAL EXAMINATION:  On examination today, he is well appearing and in no distress.  Examination of the oral cavity reveals normal mucosa of the tongue, floor of mouth, hard and soft palate, gingival and buccal mucosa, retromolar trigone and posterior pharyngeal wall.  Palpation of floor of mouth, tongue and base of tongue are negative.  He cannot tolerate mirror exam.  Examination of the neck reveals no mass or lymphadenopathy but well-healed incisions.      PROCEDURE:  Flexible endoscopy is performed through the right nasal cavity after anesthetization and vasoconstriction using Collin-Synephrine and Xylocaine.  This reveals normal nasopharynx and oropharynx.  On viewing the hypopharynx and larynx, he again has a horizontal slit-like opening for his glottis, and his arytenoids are slightly canted forward, but I do not see any lesions present today.  The posterior wall looks entirely normal.      IMPRESSION:  No evidence of disease, new sensation of tightness on swallowing.      PLAN:   1.  I would like to get a CT scan of the neck and also have him get a swallow study with Speech  just to ensure that there is no stenosis farther down or any other lesion inferior to the hypopharynx that I am unable to see today.  We will call him with the results of the scan.      cc: Prince Ring MD    [facility] [address] [facility] [place]   [address]

## 2017-12-18 NOTE — MR AVS SNAPSHOT
"              After Visit Summary   12/18/2017    Goldy Garvin    MRN: [id number]           Patient Information     Date Of Birth          1950        Visit Information        Provider Department      12/18/2017 8:45 AM Trav Machado MD [facility] Ear Nose and Throat        Today's Diagnoses     Laryngeal cancer (H)    -  1    Dysphasia          Care Instructions    Follow up with Dr. Machado in 4-6 months  Ct neck to be done today  You will be called with the results  Swallow study to be scheduled  Call me if ?'s arise [phone]  Alyssa Nicole RN          Follow-ups after your visit        Additional Services     SPEECH THERAPY REFERRAL       *This therapy referral will be filtered to a centralized scheduling office at [facility] and the patient will receive a call to schedule an appointment at a [place] location most convenient for them. *     [facility] provides Speech Therapy evaluation and treatment and many specialty services across the [place] system.  If requesting a specialty program, please choose from the list below.  If you have not heard from the scheduling office within 2 business days, please call [phone] for all locations, with the exception of [place], please call [phone].       Treatment: Evaluation & Treatment  Speech Treatment Diagnosis: Dysphagia  Special Instructions:   Special Programs: Video Swallow Study    Please be aware that coverage of these services is subject to the terms and limitations of your health insurance plan.  Call member services at your health plan with any benefit or coverage questions.      **Note to Provider:  If you are referring outside of [facility] for the therapy appointment, please list the name of the location in the \"special instructions\" above, print the referral and give to the patient to schedule the appointment.                  Your next 10 appointments already scheduled     Dec 18, 2017  3:20 PM " CST   (Arrive by 3:05 PM)   CT SOFT TISSUE NECK W CONTRAST with UCCT1   [facility] [place] [facility] ([facility] [place])    [address]  1st Floor  [facility] [address] [phone]           Please bring any scans or X-rays taken at other hospitals, if similar tests were done. Also bring a list of your medicines, including vitamins, minerals and over-the-counter drugs. It is safest to leave personal items at home.  Be sure to tell your doctor:   If you have any allergies.   If there s any chance you are pregnant.   If you are breastfeeding.   If you have any special needs.  You will have contrast for this exam. To prepare:   Do not eat or drink for 2 hours before your exam. If you need to take medicine, you may take it with small sips of water. (We may ask you to take liquid medicine as well.)   The day before your exam, drink extra fluids at least six 8-ounce glasses (unless your doctor tells you to restrict your fluids).  Patients over 70 or patients with diabetes or kidney problems:   If you haven t had a blood test (creatinine test) within the last 30 days, go to your clinic or Diagnostic Imaging Department for this test.  If you have diabetes:   If your kidney function is normal, continue taking your metformin (Avandamet, Glucophage, Glucovance, Metaglip) on the day of your exam.   If your kidney function is abnormal, wait 48 hours before restarting this medicine.  Please wear loose clothing, such as a sweat suit or jogging clothes. Avoid snaps, zippers and other metal. We may ask you to undress and put on a hospital gown.  If you have any questions, please call the Imaging Department where you will have your exam.            Dec 28, 2017  9:00 AM CST   Video Swallow with RICKY Moy   [facility] ([facility] [place])    [address]  4th Floor  [facility] [address] [phone]           Please check in for this visit in Imaging on  the 1st floor.            Dec 28, 2017  9:00 AM CST   XR VIDEO SPEECH EVALUATION WITH ESOPHAGRAM with UCXR2, UC GIGU RAD   [facility] X[facility] ([facility] [place])    [address]  1st Floor  [facility] [address] [phone]           Please bring a list of your current medicines to your exam. (Include vitamins, minerals and over-the-counter medicines.) Leave your valuables at home.  Tell the doctor if there is a chance you could be pregnant.  Do not eat for 4 hours before the exam. Keep drinking clear liquids until 2 hours before the exam.  You may take pain medicine (with a sip of water) up to 4 hours before the exam.  Do not swallow any other medicines unless your doctor tells you to. Talk to your doctor to be sure it s safe to stop your medicines.  Please call the Imaging Department at your exam site with any questions.              Future tests that were ordered for you today     Open Future Orders        Priority Expected Expires Ordered    XR Video Swallow w Esophagram Routine 12/18/2017 12/18/2018 12/18/2017    CT Soft Tissue Neck w Contrast Routine 12/18/2017 12/18/2018 12/18/2017            Who to contact     Please call your clinic at [phone] to:    Ask questions about your health    Make or cancel appointments    Discuss your medicines    Learn about your test results    Speak to your doctor   If you have compliments or concerns about an experience at your clinic, or if you wish to file a complaint, please contact [facility] Patient Relations at [phone] or email us at [email or web address]         Additional Information About Your Visit        [employer] Information     [employer] gives you secure access to your electronic health record. If you see a primary care provider, you can also send messages to your care team and make appointments. If you have questions, please call your primary care clinic.  If you do not have a  "primary care provider, please call [phone] and they will assist you.      "[employer]" is an electronic gateway that provides easy, online access to your medical records. With "[employer]", you can request a clinic appointment, read your test results, renew a prescription or communicate with your care team.     To access your existing account, please contact your [facility] or call [phone] for assistance.        Care EveryWhere ID     This is your Care EveryWhere ID. This could be used by other organizations to access your [place] medical records  [id number]        Your Vitals Were     Height BMI (Body Mass Index)                1.727 m (5' 8\") 30.41 kg/m2           Blood Pressure from Last 3 Encounters:   03/20/14 138/80   09/24/13 130/84   08/06/13 138/62    Weight from Last 3 Encounters:   12/18/17 90.7 kg (200 lb)   04/13/17 93.9 kg (207 lb)   04/10/17 93 kg (205 lb)              We Performed the Following     LARYNGOSCOPY FLEX FIBEROPTIC, DIAGNOSTIC     SPEECH THERAPY REFERRAL        Primary Care Provider Office Phone # Fax #    Prince Ring [phone]       [facility] [address] [place] [address]        Equal Access to Services     ANNA MARIE DYE : Hadii aad ku hadasho Soomaali, waaxda luqadaha, qaybta kaalmada adeegyada, waxay idiin hayalexin jose f campos. So [facility] [phone].    ATENCIÓN: Si habla español, tiene a soriano disposición servicios gratuitos de asistencia lingüística. Llame al [phone].    We comply with applicable federal civil rights laws and [place] laws. We do not discriminate on the basis of race, color, national origin, age, disability, sex, sexual orientation, or gender identity.            Thank you!     Thank you for choosing [facility] EAR NOSE [facility] THROAT  for your care. Our goal is always to provide you with excellent care. Hearing back from our patients is one way we can continue to improve our " services. Please take a few minutes to complete the written survey that you may receive in the mail after your visit with us. Thank you!             Your Updated Medication List - Protect others around you: Learn how to safely use, store and throw away your medicines at www.disposemymeds.org.          This list is accurate as of: 12/18/17  9:55 AM.  Always use your most recent med list.                   Brand Name Dispense Instructions for use Diagnosis    amLODIPine 2.5 MG tablet    NORVASC     Take 2.5 mg by mouth        ASPIRIN PO      Take 162 mg by mouth daily        atorvastatin 80 MG tablet    LIPITOR     20 mg by Per Feeding Tube route 4 times daily        FISH OIL PO           LISINOPRIL PO      Take 10 mg by mouth daily        MULTI-VITAMINS PO      Take by mouth daily        nitroGLYcerin 0.4 MG sublingual tablet    NITROSTAT     Place 0.4 mg under the tongue        POTASSIUM CHLORIDE ER PO      Take 10 mg by mouth 2 times daily        PRILOSEC PO      Take 20 mg by mouth 2 times daily (before meals)

## 2017-12-18 NOTE — PROGRESS NOTES
Dr. Machado reviewed Meng's CT neck  Meng was called and notified of the results.  He will proceed with swallow study

## 2017-12-18 NOTE — DISCHARGE INSTRUCTIONS

## 2017-12-28 ENCOUNTER — RADIANT APPOINTMENT (OUTPATIENT)
Dept: GENERAL RADIOLOGY | Facility: CLINIC | Age: 67
End: 2017-12-28
Attending: OTOLARYNGOLOGY
Payer: MEDICARE

## 2017-12-28 ENCOUNTER — THERAPY VISIT (OUTPATIENT)
Dept: SPEECH THERAPY | Facility: CLINIC | Age: 67
End: 2017-12-28
Payer: MEDICARE

## 2017-12-28 DIAGNOSIS — R13.13 PHARYNGEAL DYSPHAGIA: Primary | ICD-10-CM

## 2017-12-28 DIAGNOSIS — R47.02 DYSPHASIA: ICD-10-CM

## 2017-12-28 RX ORDER — BARIUM SULFATE 400 MG/ML
30 SUSPENSION ORAL ONCE
Status: COMPLETED | OUTPATIENT
Start: 2017-12-28 | End: 2017-12-28

## 2017-12-28 RX ADMIN — BARIUM SULFATE 15 ML: 400 SUSPENSION ORAL at 09:57

## 2017-12-28 NOTE — PROGRESS NOTES
OUTPATIENT SWALLOW  EVALUATION  PLAN OF TREATMENT FOR OUTPATIENT REHABILITATION  (COMPLETE FOR INITIAL CLAIMS ONLY)  Patient's Last Name, First Name, M.I.  YOB: 1950  Goldy Garvin     Provider's Name   Roseann Peterson   Medical Record No.  [id number]     Start of Care Date:  12/28/17   Onset Date:  11/28/17   Type:     ___PT   ____OT  ___X_SLP Medical Diagnosis:  Dysphagia     Treatment Diagnosis:  Mild pharyngeal dysphagia Visits from SOC:  1     _________________________________________________________________________________  Plan of Treatment/Functional Goals:  Planned Therapy Interventions: Dysphagia Treatment  Dysphagia treatment: Oropharyngeal exercise training, Modified diet education, Instruction of safe swallow strategies, Compensatory strategies for swallowing                     Goals   1. Goal Identifier: Diet       Goal Description: 1. Pt will tolerate soft, moist regular textures and thin liquids without overt s/sx of aspiration and with use of swallowing strategies, independently, in 90% of PO trials as judged by SLP and/or pt/family report.        Target Date: 03/28/18           2. Goal Identifier: Exercises       Goal Description: 2. Pt will improve and/or maintain ROM and strength of BOT, pharynx and jaw by completing 10 repetitions of Effortful swallow, Mendelsohn maneuver, Kristyn, and Shaker exercises 3 times daily with minimal written or verbal cues.       Target Date: 03/28/18           3.                             4.                             5.                            6.                              7.                              8.                              Therapy Frequency: other (see comments)  Predicted Duration of Therapy Intervention (days/wks): 1x/month x 6 months (follow-up in ENT clinic)    Roseann Peterson, SLP       I CERTIFY THE NEED FOR THESE SERVICES FURNISHED UNDER         THIS PLAN OF TREATMENT AND WHILE UNDER MY CARE     (Physician attestation of this document indicates review and certification of the therapy plan).                    Certification date from: 12/28/17 Certification date to: 03/28/18          Referring Physician: Dr. Trav Machado    Initial Assessment        See Epic Evaluation Start Of Care Date: 12/28/17

## 2017-12-28 NOTE — PROGRESS NOTES
12/28/17 0900   General Information   Type Of Visit Initial   Start Of Care Date 12/28/17   Referring Physician Dr. 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 Pt was referred for a repeat VFSS due to concerns of swallowing feeling tighter and more restricted. Pt's most recent VFSS was completed in 11/2013 which demonstrated flash penetration of all consistencies. Upon clinical interview today, pt reported he noticed increased tightness when swallowing ~1 month ago. He reported he feels as if he has a lump in his throat but stated his recent CT scan was negative. He endorsed eating mostly regular textures but stated he avoids juicy fruits as well as dry, particulate foods such as Doritos, shredded wheat cereal, and rice. He endorsed frequent coughing when eating these foods due to feeling as if pieces of these foods go into his windpipe. He reported coughing with liquids when he is not paying attention and/or if he does not take single sips. He also reported coughing when eating ice cream. Pt denied recent pneumonias. Stated he has lost ~10 lbs since June but this has been somewhat intentional. He endorsed he takes pills with a Stacy drink that is thicker as he coughs if he takes pills with water. He denied pain while swallowing or voice changes since his surgery. He endorsed a history of reflux.   Respiratory Status Room air   Prior Level Of Function Swallowing   Prior Level Of Function Comment Avoids juicy fruits and dry, particulate foods; has coughing with thin liquids at  times   General Observations Pt was pleasant and cooperative throughout evaluation. He was accompanied by his wife.    Patient/family Goals To understand what is causing the restricted feeling   Pain Assessment   Pain Reported No   Fall Risk Screen   Fall screen completed by SLP   Have you fallen 2 or more times in the past year? No   Have you fallen and had an injury in the past year? No   Is patient a fall risk? No   Clinical Swallow Evaluation   Oral Musculature generally intact   Structural Abnormalities present  (s/p supracricoid laryngectomy)   Dentition present and adequate   Mucosal Quality good   Mandibular Strength and Mobility intact   Oral Labial Strength and Mobility WFL   Lingual Strength and Mobility WFL   Velar Elevation intact   Laryngeal Function Swallow;Voicing initiated;Cough   Oral Musculature Comments Voice hoarse with minimal phonation   VFSS Evaluation   VFSS Additional Documentation Yes   VFSS Eval: Radiology   Radiologist Resident   Views Taken left lateral;A/P   Physical Location of Procedure [facility]   VFSS Eval: Thin Liquid Texture Trial   Mode of Presentation, Thin Liquid spoon;cup;self-fed   Order of Presentation 1, 2, 3, 4, 7, 8, 10, 12, 14, 16   Preparatory Phase WFL   Oral Phase, Thin Liquid WFL   Pharyngeal Phase, Thin Liquid other (see comments)  (Reduced epiglottic inversion)   Rosenbek's Penetration Aspiration Scale: Thin Liquid Trial Results 3 - contrast remains above the vocal cords, visible residue remains (penetration)   Response to Aspiration other (see comments)  (delayed but productive reflexive cough)   Diagnostic Statement Prompt swallow response. Inadequate epilgottic inversion resulting in occasional trace penentration. Delayed reflexive cough effective in clearing residuals.    VFSS Eval: Nectar Thick Liquid Texture Trial   Mode of Presentation, Nectar cup;self-fed   Order of Presentation 5, 15  (15-barium tablet)   Preparatory Phase WFL   Oral Phase, [place] WFL    Pharyngeal Phase, Nectar other (see comments)  (reduced epiglottic inversion)   Rosenbek's Penetration Aspiration Scale: Nectar-Thick Liquid Trial Results 1 - no aspiration, contrast does not enter airway   Diagnostic Statement Prompt swallow response with effective airway protection despite inadequate epiglottic inversion. Barium tablet swallowed without difficulty.    VFSS Eval: Puree Solid Texture Trial   Mode of Presentation, Puree spoon;self-fed   Order of Presentation 6, 9   Preparatory Phase WFL   Oral Phase, Puree WFL   Pharyngeal Phase, Puree WFL   Rosenbek's Penetration Aspiration Scale: Puree Food Trial Results 1 - no aspiration, contrast does not enter airway   Diagnostic Statement Prompt swallow response with effective airway protection. No pharyngeal residuals. Minimal-mild esophageal residue noted which cleared with liquid wash.    VFSS Eval: Solid Food Texture Trial   Mode of Presentation, Solid self-fed   Order of Presentation 11, 13   Preparatory Phase WFL   Oral Phase, Solid WFL   Pharyngeal Phase, Solid WFL   Rosenbek's Penetration Aspiration Scale: Solid Food Trial Results 1 - no aspiration, contrast does not enter airway   Diagnostic Statement Adequate mastication. Prompt swallow response with effective airway protection. Adequate epiglottic inversion.    Swallow Compensations   Swallow Compensations Reduce amounts   Educational Assessment   Barriers to Learning No barriers   Esophageal Phase of Swallow   Patient reports or presents with symptoms of esophageal dysphagia Yes   Esophageal sweep performed during today s vidofluoroscopic exam  Yes;Please refer to radiologist's report for details   Esophageal comments Pt reported history of reflux. Cricopharyngeal bar noted during study; however, minimally contribuatory. Cervical osteophytes present per radiologist. Esophagram completed after VFSS. Please refer to radiologist report for further details.    General Therapy Interventions   Planned  Therapy Interventions Dysphagia Treatment   Dysphagia treatment Oropharyngeal exercise training;Modified diet education;Instruction of safe swallow strategies;Compensatory strategies for swallowing   Swallow Eval: Clinical Impressions   Skilled Criteria for Therapy Intervention Skilled criteria met.  Treatment indicated.   Dysphagia Outcome Severity Scale (ALEXIS) Level 5 - ALEXIS   Treatment Diagnosis Mild pharyngeal dysphagia   Diet texture recommendations Dysphagia diet level 3;Thin liquids   Recommended Feeding/Eating Techniques alternate between small bites and sips of food/liquid;hard swallow w/ each bite or sip;small sips/bites;other (see comments)  (reflux precautions, oral cares)   Rehab Potential good, to achieve stated therapy goals   Therapy Frequency other (see comments)   Predicted Duration of Therapy Intervention (days/wks) 1x/month x 6 months  (follow-up in ENT clinic)   Anticipated Discharge Disposition home w/ outpatient services   Risks and Benefits of Treatment have been explained. 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 Oral phase is WFL and is characterized by adequate mastication, bolus control, and oral clearance. Pharyngeal phase is marked by reduced hyolaryngeal elevation resulting in inadequate epiglottic inversion with liquid consistencies and subsequent occasional trace penetration of thin liquids. Pt demonstrates delayed but reflexive cough which effectively clears residuals. Epiglottic inversion improves with thicker viscosities. No penetration or aspiration present with nectar-thick liquids, purees, or solid textures. No pharyngeal residuals present. Pt demonstrates mild esophageal difficulties marked by inconsistent and minimal esophageal stasis after bites  of puree textures, cricopharyngeal bar, and cervical osteophytes. Cricopharyngeal bar and cervical osteophytes do not appear to hinder swallow at this time. Please refer to radiologist report for additional details. Given results of VFSS, pt is at mildly increased risk of aspiration. Risk can be reduced with use of safe swallowing strategies and modified diet textures as listed above. 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    Swallow Goals   SLP Swallow Goals 1;2   Swallow Goal 1   Goal Identifier Diet   Goal Description 1. Pt will tolerate soft, moist regular textures and thin liquids without overt s/sx of aspiration and with use of swallowing strategies, independently, in 90% of PO trials as judged by SLP and/or pt/family report.    Target Date 03/28/18   Swallow Goal 2   Goal Identifier Exercises   Goal Description 2. Pt will improve and/or maintain ROM and strength of BOT, pharynx and jaw by completing 10 repetitions of Effortful swallow, Mendelsohn maneuver, Kristyn, and Shaker exercises 3 times daily with minimal written or verbal cues.   Target Date 03/28/18   Total Session Time   Total Session Time 70   Total Evaluation Time 50   Therapy Certification   Certification date from 12/28/17   Certification date to 03/28/18   Medical Diagnosis Dysphagia   Certification I certify the need for these services furnished under this plan of treatment and while under my care.  (Physician co-signature of this document indicates review and certification of the therapy plan).     Thank you for the referral of Goldy Garvin. If you have any questions about this report, please contact me using the information below.     Roseann Peterson MA, CCC-SLP  Speech-Language Pathologist   [facility]  Department of Otolaryngology/D&T - 4th floor  Pager: [phone]  Phone: [phone]  Email:  [email or web address]

## 2017-12-28 NOTE — MR AVS SNAPSHOT
After Visit Summary   12/28/2017    Goldy Garvin    MRN: [id number]           Patient Information     Date Of Birth          1950        Visit Information        Provider Department      12/28/2017 9:00 AM Roseann Peterson SLP M [facility]        Today's Diagnoses     Pharyngeal dysphagia    -  1       Follow-ups after your visit        Your next 10 appointments already scheduled     Apr 09, 2018 10:00 AM CDT   (Arrive by 9:45 AM)   RETURN TUMOR VISIT with MD SWATHI Bro [facility] ([facility])    [address] [place] [address] [phone]              Who to contact     Please call your clinic at [phone] to:    Ask questions about your health    Make or cancel appointments    Discuss your medicines    Learn about your test results    Speak to your doctor   If you have compliments or concerns about an experience at your clinic, or if you wish to file a complaint, please contact [facility] Patient Relations at [phone] or email us at [email or web address]         Additional Information About Your Visit        MyChart Information     [employer] gives you secure access to your electronic health record. If you see a primary care provider, you can also send messages to your care team and make appointments. If you have questions, please call your primary care clinic.  If you do not have a primary care provider, please call [phone] and they will assist you.      [employer] is an electronic gateway that provides easy, online access to your medical records. With [employer], you can request a clinic appointment, read your test results, renew a prescription or communicate with your care team.     To access your existing account, please contact your [facility] or call [phone] for assistance.        Care EveryWhere ID     This is your Care  EveryWhere ID. This could be used by other organizations to access your [place] medical records  [id number]         Blood Pressure from Last 3 Encounters:   03/20/14 138/80   09/24/13 130/84   08/06/13 138/62    Weight from Last 3 Encounters:   12/18/17 90.7 kg (200 lb)   04/13/17 93.9 kg (207 lb)   04/10/17 93 kg (205 lb)              Today, you had the following     No orders found for display       Primary Care Provider Office Phone # Fax #    Prince Ring [phone]       [facility] [address] [place] [address]        Equal Access to Services     ANNA MARIE DYE : Hadii bao carlino Soazul, waaxda luqadaha, qaybta kaalmada adeegyada, mk campos. So [facility] [phone].    ATENCIÓN: Si habla español, tiene a soriano disposición servicios gratuitos de asistencia lingüística. Llame al [phone].    We comply with applicable federal civil rights laws and [place] laws. We do not discriminate on the basis of race, color, national origin, age, disability, sex, sexual orientation, or gender identity.            Thank you!     Thank you for choosing [facility]  for your care. Our goal is always to provide you with excellent care. Hearing back from our patients is one way we can continue to improve our services. Please take a few minutes to complete the written survey that you may receive in the mail after your visit with us. Thank you!             Your Updated Medication List - Protect others around you: Learn how to safely use, store and throw away your medicines at www.disposemymeds.org.          This list is accurate as of: 12/28/17  2:13 PM.  Always use your most recent med list.                   Brand Name Dispense Instructions for use Diagnosis    amLODIPine 2.5 MG tablet    NORVASC     Take 2.5 mg by mouth        ASPIRIN PO      Take 162 mg by mouth daily        atorvastatin 80 MG tablet    LIPITOR     20 mg by Per Feeding Tube  route 4 times daily        FISH OIL PO           LISINOPRIL PO      Take 10 mg by mouth daily        MULTI-VITAMINS PO      Take by mouth daily        nitroGLYcerin 0.4 MG sublingual tablet    NITROSTAT     Place 0.4 mg under the tongue        POTASSIUM CHLORIDE ER PO      Take 10 mg by mouth 2 times daily        PRILOSEC PO      Take 20 mg by mouth 2 times daily (before meals)

## 2018-07-30 ENCOUNTER — OFFICE VISIT (OUTPATIENT)
Dept: OTOLARYNGOLOGY | Facility: CLINIC | Age: 68
End: 2018-07-30
Payer: MEDICARE

## 2018-07-30 ENCOUNTER — RADIANT APPOINTMENT (OUTPATIENT)
Dept: CT IMAGING | Facility: CLINIC | Age: 68
End: 2018-07-30
Attending: OTOLARYNGOLOGY
Payer: MEDICARE

## 2018-07-30 ENCOUNTER — THERAPY VISIT (OUTPATIENT)
Dept: SPEECH THERAPY | Facility: CLINIC | Age: 68
End: 2018-07-30
Payer: MEDICARE

## 2018-07-30 VITALS — WEIGHT: 197 LBS | BODY MASS INDEX: 29.95 KG/M2

## 2018-07-30 DIAGNOSIS — C32.9 LARYNGEAL CANCER (H): ICD-10-CM

## 2018-07-30 DIAGNOSIS — C32.9 LARYNGEAL CANCER (H): Primary | ICD-10-CM

## 2018-07-30 DIAGNOSIS — R13.13 PHARYNGEAL DYSPHAGIA: Primary | ICD-10-CM

## 2018-07-30 LAB
CREAT BLD-MCNC: 1.1 MG/DL (ref 0.66–1.25)
GFR SERPL CREATININE-BSD FRML MDRD: 66 ML/MIN/1.7M2

## 2018-07-30 RX ORDER — IOPAMIDOL 755 MG/ML
96 INJECTION, SOLUTION INTRAVASCULAR ONCE
Status: COMPLETED | OUTPATIENT
Start: 2018-07-30 | End: 2018-07-30

## 2018-07-30 RX ADMIN — IOPAMIDOL 96 ML: 755 INJECTION, SOLUTION INTRAVASCULAR at 11:49

## 2018-07-30 ASSESSMENT — PAIN SCALES - GENERAL: PAINLEVEL: NO PAIN (0)

## 2018-07-30 NOTE — PROGRESS NOTES
HISTORY OF PRESENT ILLNESS:  Mr. Garvin returns.  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  I did have to take him to the operating room a few times afterwards to laser away some scar tissue and granulation tissue at the anterior aspect of his closure.  Over the last couple of years, things have been fairly stable.  His breathing has been stable.  He has no complaints in that regard.  He has been eating and drinking well.  He is retired now and is enjoying that.  He denies odynophagia, dysphagia, hoarseness or otalgia.      PHYSICAL EXAMINATION:  He is well appearing, in no distress.  Examination of the oral cavity reveals normal mucosa of the tongue, floor of mouth, hard and soft palate, gingival and buccal mucosa, retromolar trigone and posterior pharyngeal wall.  Palpation of floor of mouth, tongue and base of tongue are negative.  He cannot tolerate mirror examination.      PROCEDURE:  Flexible endoscopy is performed through the left nasal cavity after anesthetization and vasoconstriction using Collin-Synephrine and Xylocaine.  This reveals normal nasopharynx, oropharynx, hypopharynx.  In viewing the larynx, his epiglottis is normal.  The arytenoids are a little bit less mobile today but are still slightly canted forward.  There are no lesions present.  His glottic opening is slit-like and horizontal which is unchanged.      IMPRESSION:  No evidence of disease.      PLAN:  He will follow up yearly or on a p.r.n. basis now that he is greater than five years out.  We will get a CT scan since he is due for one to confirm that he is indeed disease free at this point.      cc: Prince Ring MD    [facility]    [address] [facility] [place]   [address]

## 2018-07-30 NOTE — LETTER
7/30/2018       RE: Goldy Garvin  [address]  [place] [address]     Dear Colleague,    Thank you for referring your patient, Goldy Garvin, to the [facility] at [facility]. Please see a copy of my visit note below.    HISTORY OF PRESENT ILLNESS:  Mr. Garvin returns.  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  I did have to take him to the operating room a few times afterwards to laser away some scar tissue and granulation tissue at the anterior aspect of his closure.  Over the last couple of years, things have been fairly stable.  His breathing has been stable.  He has no complaints in that regard.  He has been eating and drinking well.  He is retired now and is enjoying that.  He denies odynophagia, dysphagia, hoarseness or otalgia.      PHYSICAL EXAMINATION:  He is well appearing, in no distress.  Examination of the oral cavity reveals normal mucosa of the tongue, floor of mouth, hard and soft palate, gingival and buccal mucosa, retromolar trigone and posterior pharyngeal wall.  Palpation of floor of mouth, tongue and base of tongue are negative.  He cannot tolerate mirror examination.      PROCEDURE:  Flexible endoscopy is performed through the left nasal cavity after anesthetization and vasoconstriction using Collin-Synephrine and Xylocaine.  This reveals normal nasopharynx, oropharynx, hypopharynx.  In viewing the larynx, his epiglottis is normal.  The arytenoids are a little bit less mobile today but are still slightly canted forward.  There are no lesions present.  His glottic opening is slit-like and horizontal which is unchanged.      IMPRESSION:  No evidence of disease.      PLAN:  He will follow up yearly or on a p.r.n. basis now that he is greater than five years out.  We will get a CT scan since he is due for one to confirm  that he is indeed disease free at this point.      Again, thank you for allowing me to participate in the care of your patient.      Sincerely,    Trav Machado MD      cc: Prince Ring MD    [facility] [address] [place]   [address]

## 2018-07-30 NOTE — DISCHARGE INSTRUCTIONS

## 2018-07-30 NOTE — PATIENT INSTRUCTIONS
-Please schedule CT scan for surveillance. Our clinic will call with results once available.   -Follow up with Dr. Machado in 1 year or as needed.   -Please call the ENT clinic at [phone] with any questions or concerns.

## 2018-07-30 NOTE — MR AVS SNAPSHOT
After Visit Summary   7/30/2018    Goldy Garvin    MRN: [id number]           Patient Information     Date Of Birth          1950        Visit Information        Provider Department      7/30/2018 10:15 AM Trav Machado MD [facility]        Today's Diagnoses     Laryngeal cancer (H)    -  1      Care Instructions    -Please schedule CT scan for surveillance. Our clinic will call with results once available.   -Follow up with Dr. Machado in 1 year or as needed.   -Please call the ENT clinic at [phone] with any questions or concerns.          Follow-ups after your visit        Your next 10 appointments already scheduled     Jul 30, 2018  4:20 PM CDT   CT SOFT TISSUE NECK W CONTRAST with UCCT1   [facility] CT ([facility])    [address] [address] [phone]           Please bring any scans or X-rays taken at other hospitals, if similar tests were done. Also bring a list of your medicines, including vitamins, minerals and over-the-counter drugs. It is safest to leave personal items at home.  Be sure to tell your doctor:   If you have any allergies.   If there s any chance you are pregnant.   If you are breastfeeding.    If you have diabetes as your medication may need to be adjusted for this exam.  You will have contrast for this exam. To prepare:   Do not eat or drink for 2 hours before your exam. If you need to take medicine, you may take it with small sips of water. (We may ask you to take liquid medicine as well.)   The day before your exam, drink extra fluids at least six 8-ounce glasses (unless your doctor tells you to restrict your fluids).  Patients over 70 or patients with diabetes or kidney problems:   If you haven t had a blood test (creatinine test) within the last 30 days, the Cardiologist/Radiologist may require you to get this test prior to your exam.  Please wear loose  clothing, such as a sweat suit or jogging clothes. Avoid snaps, zippers and other metal. We may ask you to undress and put on a hospital gown.  If you have any questions, please call the Imaging Department where you will have your exam.            Jul 30, 2018  4:40 PM CDT   CT CHEST W CONTRAST with UCCT1   [facility] CT ([facility])    [address] [facility]  1st Floor  [facility] [address] [phone]           Please bring any scans or X-rays taken at other hospitals, if similar tests were done. Also bring a list of your medicines, including vitamins, minerals and over-the-counter drugs. It is safest to leave personal items at home.  Be sure to tell your doctor:   If you have any allergies.   If there s any chance you are pregnant.   If you are breastfeeding.    If you have diabetes as your medication may need to be adjusted for this exam.  You will have contrast for this exam. To prepare:   Do not eat or drink for 2 hours before your exam. If you need to take medicine, you may take it with small sips of water. (We may ask you to take liquid medicine as well.)   The day before your exam, drink extra fluids at least six 8-ounce glasses (unless your doctor tells you to restrict your fluids).  Patients over 70 or patients with diabetes or kidney problems:   If you haven t had a blood test (creatinine test) within the last 30 days, the Cardiologist/Radiologist may require you to get this test prior to your exam.  Please wear loose clothing, such as a sweat suit or jogging clothes. Avoid snaps, zippers and other metal. We may ask you to undress and put on a hospital gown.  If you have any questions, please call the Imaging Department where you will have your exam.              Future tests that were ordered for you today     Open Future Orders        Priority Expected Expires Ordered    CT Soft tissue neck w contrast Routine  7/30/2019 7/30/2018    CT Chest w contrast*  Routine  7/30/2019 7/30/2018            Who to contact     Please call your clinic at [phone] to:    Ask questions about your health    Make or cancel appointments    Discuss your medicines    Learn about your test results    Speak to your doctor            Additional Information About Your Visit        [employer] Information     [employer] gives you secure access to your electronic health record. If you see a primary care provider, you can also send messages to your care team and make appointments. If you have questions, please call your primary care clinic.  If you do not have a primary care provider, please call [phone] and they will assist you.      [employer] is an electronic gateway that provides easy, online access to your medical records. With [employer], you can request a clinic appointment, read your test results, renew a prescription or communicate with your care team.     To access your existing account, please contact your [facility] or call [phone] for assistance.        Care EveryWhere ID     This is your Care EveryWhere ID. This could be used by other organizations to access your [place] medical records  [id number]        Your Vitals Were     BMI (Body Mass Index)                   29.95 kg/m2            Blood Pressure from Last 3 Encounters:   03/20/14 138/80   09/24/13 130/84   08/06/13 138/62    Weight from Last 3 Encounters:   07/30/18 89.4 kg (197 lb)   12/18/17 90.7 kg (200 lb)   04/13/17 93.9 kg (207 lb)               Primary Care Provider Office Phone # Fax #    Prince Ring [phone]       [facility] [address] [place] [address]        Equal Access to Services     [facility]: Hadii bao blake hadasho Sofarazali, waaxda luqadaha, qaybta kaalmada heri, mk campos. So [facility] [phone].    ATENCIÓN: Si habla español, tiene a soriano disposición servicios gratuitos de asistencia  lingüística. Alireza al [phone].    We comply with applicable federal civil rights laws and [place] laws. We do not discriminate on the basis of race, color, national origin, age, disability, sex, sexual orientation, or gender identity.            Thank you!     Thank you for choosing [facility]  for your care. Our goal is always to provide you with excellent care. Hearing back from our patients is one way we can continue to improve our services. Please take a few minutes to complete the written survey that you may receive in the mail after your visit with us. Thank you!             Your Updated Medication List - Protect others around you: Learn how to safely use, store and throw away your medicines at www.disposemymeds.org.          This list is accurate as of 7/30/18 11:24 AM.  Always use your most recent med list.                   Brand Name Dispense Instructions for use Diagnosis    amLODIPine 2.5 MG tablet    NORVASC     Take 2.5 mg by mouth        ASPIRIN PO      Take 162 mg by mouth daily        atorvastatin 80 MG tablet    LIPITOR     20 mg by Per Feeding Tube route 4 times daily        FISH OIL PO           LISINOPRIL PO      Take 10 mg by mouth daily        MULTI-VITAMINS PO      Take by mouth daily        nitroGLYcerin 0.4 MG sublingual tablet    NITROSTAT     Place 0.4 mg under the tongue        POTASSIUM CHLORIDE ER PO      Take 10 mg by mouth 2 times daily        PRILOSEC PO      Take 20 mg by mouth 2 times daily (before meals)

## 2018-07-30 NOTE — DISCHARGE INSTRUCTIONS

## 2018-07-30 NOTE — NURSING NOTE
Chief Complaint   Patient presents with     RECHECK     follow up     Weight 89.4 kg (197 lb).    Harris Danielson LPN

## 2018-07-31 ENCOUNTER — CARE COORDINATION (OUTPATIENT)
Dept: OTOLARYNGOLOGY | Facility: CLINIC | Age: 68
End: 2018-07-31

## 2018-07-31 NOTE — PROGRESS NOTES
Called patient's wife and left message with patient's CT neck and Chest results. Wife was encouraged to call with further questions.     La Jesus RN, BSN

## 2019-10-01 ENCOUNTER — HEALTH MAINTENANCE LETTER (OUTPATIENT)
Age: 69
End: 2019-10-01

## 2019-12-15 ENCOUNTER — HEALTH MAINTENANCE LETTER (OUTPATIENT)
Age: 69
End: 2019-12-15

## 2020-05-18 ENCOUNTER — TELEPHONE (OUTPATIENT)
Dept: OTOLARYNGOLOGY | Facility: CLINIC | Age: 70
End: 2020-05-18

## 2020-05-18 NOTE — TELEPHONE ENCOUNTER
Called patient back regarding call from call center. Spoke to patients wife (Vianca). She expressed concern regarding her 's increasing difficulty in swallowing. She states he does not have difficulty breathing. She states he has been having throat tightness when he is swallows. She states in the last couple weeks he has been having a light sore throat. She states he does not have a fever.    Janice Gomes LPN

## 2020-05-18 NOTE — TELEPHONE ENCOUNTER
Called patient back to confirm appointment time. Left VM with appointment time and date with Dr Machado scheduled for 5/27/2020 at 1515. Writer informed of no visitors policy.      Janice Gomes LPN

## 2020-05-20 ENCOUNTER — DOCUMENTATION ONLY (OUTPATIENT)
Dept: CARE COORDINATION | Facility: CLINIC | Age: 70
End: 2020-05-20

## 2020-05-27 ENCOUNTER — OFFICE VISIT (OUTPATIENT)
Dept: OTOLARYNGOLOGY | Facility: CLINIC | Age: 70
End: 2020-05-27
Payer: MEDICARE

## 2020-05-27 ENCOUNTER — THERAPY VISIT (OUTPATIENT)
Dept: SPEECH THERAPY | Facility: CLINIC | Age: 70
End: 2020-05-27
Payer: MEDICARE

## 2020-05-27 VITALS
HEIGHT: 70 IN | RESPIRATION RATE: 19 BRPM | HEART RATE: 58 BPM | WEIGHT: 201 LBS | OXYGEN SATURATION: 9 % | SYSTOLIC BLOOD PRESSURE: 123 MMHG | TEMPERATURE: 98.1 F | BODY MASS INDEX: 28.77 KG/M2 | DIASTOLIC BLOOD PRESSURE: 77 MMHG

## 2020-05-27 DIAGNOSIS — C32.9 LARYNGEAL CANCER (H): Primary | ICD-10-CM

## 2020-05-27 DIAGNOSIS — R13.14 PHARYNGOESOPHAGEAL DYSPHAGIA: ICD-10-CM

## 2020-05-27 DIAGNOSIS — R13.12 OROPHARYNGEAL DYSPHAGIA: ICD-10-CM

## 2020-05-27 RX ORDER — CLINDAMYCIN HCL 150 MG
450 CAPSULE ORAL
COMMUNITY
Start: 2020-05-22 | End: 2020-05-29

## 2020-05-27 RX ORDER — OMEPRAZOLE 10 MG/1
20 CAPSULE, DELAYED RELEASE ORAL
COMMUNITY
Start: 2020-04-14

## 2020-05-27 RX ORDER — CIPROFLOXACIN 500 MG/1
500 TABLET, FILM COATED ORAL
COMMUNITY
Start: 2020-05-22 | End: 2020-05-29

## 2020-05-27 RX ORDER — TAMSULOSIN HYDROCHLORIDE 0.4 MG/1
0.4 CAPSULE ORAL
COMMUNITY
Start: 2019-08-14

## 2020-05-27 RX ORDER — ALBUTEROL SULFATE 90 UG/1
1-2 AEROSOL, METERED RESPIRATORY (INHALATION)
COMMUNITY
Start: 2019-05-18

## 2020-05-27 RX ORDER — ATENOLOL AND CHLORTHALIDONE TABLET 50; 25 MG/1; MG/1
1 TABLET ORAL
COMMUNITY
Start: 2020-04-30

## 2020-05-27 RX ORDER — ACETAMINOPHEN 500 MG
500-1000 TABLET ORAL
COMMUNITY

## 2020-05-27 ASSESSMENT — MIFFLIN-ST. JEOR: SCORE: 1672.98

## 2020-05-27 ASSESSMENT — PAIN SCALES - GENERAL: PAINLEVEL: NO PAIN (0)

## 2020-05-27 NOTE — PROGRESS NOTES
HISTORY OF PRESENT ILLNESS:  Mr. Garvin returns.  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  I did have to take him to the operating room a few times afterwards to laser away some scar tissue and granulation tissue at the anterior aspect of the closure, but his breathing has been fairly stable since that time.  He comes back today because he has noticed a feeling of foreign body when he swallows.  He does not describe it as making it difficult for him to swallow, but he does feel like there is something there and notes that it has been there for about the last three weeks and was concerning to him.  He has not had any otalgia, hemoptysis, or odynophagia.  He has not noticed any lumps or bumps in his neck.  He also was told that he had an episode of parotitis on the right side and had a little bit of swelling of his right parotid gland that was tender.  He started taking antibiotics for this with his primary care doctor and feels it may be a little bit smaller on the right side.      PHYSICAL EXAMINATION:  Examination of the oral cavity reveals normal mucosa of the tongue, floor of mouth, hard and soft palate, gingival and buccal mucosa, retromolar trigone and posterior pharyngeal wall.  Palpation of floor and tongue base are negative.  He cannot tolerate mirror exam.  Examination of the neck reveals a well-healed incision in the midline of the neck.  When I palpate the right parotid tail aspect, there is what appears to be a nodule, although it is difficult to tell if this is just a partially sialadenotic or inflamed part of the gland.  The rest of the neck is unremarkable.  There is no lymphadenopathy or mass in the left parotid gland.      PROCEDURE:  Flexible endoscopy is performed through the right nasal cavity after anesthetization and vasoconstriction.  This reveals a normal nasopharynx and oropharynx.  In viewing the hypopharynx and larynx, his arytenoids are quite prominent with the right one falling a little bit in front of the left one.  His glottic opening is somewhat slit-like and this is typical for a supracricoid. I am able to get the scope just to the level of the aperture and see into the trachea, which appears normal.  No lesions were seen.      IMPRESSION:     1) No evidence of malignant lesion.   2)  Foreign body sensation with swallowing.       PLAN:   1.  I would like to get a video swallow to see if there is any tightness below the area that I can see that may require dilation at this point. Further investigation might include a CT scan, but I do not see any evidence of any lesions today.   2.  I also encouraged the patient to make sure he lets me know if the right parotid nodule does not decrease in size in the next one to two weeks, at which point he would need a needle biopsy of this and perhaps imaging.

## 2020-05-27 NOTE — PATIENT INSTRUCTIONS
1. You will receive a call from scheduling to arrange video swallow study.   2. Please call the ENT clinic with any questions,concerns, new or worsening symptoms.    -Clinic number is [phone]   - La's direct line (Dr. Machado's nurse) [phone]

## 2020-05-27 NOTE — NURSING NOTE
"Chief Complaint   Patient presents with     RECHECK     follow up      Blood pressure 123/77, pulse 58, temperature 98.1  F (36.7  C), resp. rate 19, height 1.778 m (5' 10\"), weight 91.2 kg (201 lb), SpO2 (!) 9 %.    Harris Danielson LPN    "

## 2020-05-27 NOTE — DISCHARGE INSTRUCTIONS
Roseann Peterson MA, CCC-SLP  Speech-Language Pathologist   [facility]   Dept of Otolaryngology/D&T - 4th floor  Dept of Rehabilitation Services   Pager: [phone]  Phone: [phone]  Email: [email or web address]   Workdays: Tuesday, Wednesday, Friday     Kim Doyle MS, CCC-SLP  Speech Language Pathologist  [facility]    Dept. of Otolaryngology  Department of Rehabilitation services  Email: [email or web address]   Pager: [phone]  Voicemail: [phone]    MARISA Maldonado (music), M.A., CFY-SLP  Speech Language Pathology Clinical Fellow  [place] Trained Vocologist   [facility] Voice [facility]   [phone] (Direct)  [email or web address]

## 2020-05-27 NOTE — LETTER
5/27/2020       RE: Goldy Garvin  [address]  [place] [address]     Dear Colleague,    Thank you for referring your patient, Goldy Garvin, to the [facility] EAR NOSE AND THROAT at [facility]. Please see a copy of my visit note below.    HISTORY OF PRESENT ILLNESS:  Mr. Garvin returns.  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  I did have to take him to the operating room a few times afterwards to laser away some scar tissue and granulation tissue at the anterior aspect of the closure, but his breathing has been fairly stable since that time.  He comes back today because he has noticed a feeling of foreign body when he swallows.  He does not describe it as making it difficult for him to swallow, but he does feel like there is something there and notes that it has been there for about the last three weeks and was concerning to him.  He has not had any otalgia, hemoptysis, or odynophagia.  He has not noticed any lumps or bumps in his neck.  He also was told that he had an episode of parotitis on the right side and had a little bit of swelling of his right parotid gland that was tender.  He started taking antibiotics for this with his primary care doctor and feels it may be a little bit smaller on the right side.      PHYSICAL EXAMINATION:  Examination of the oral cavity reveals normal mucosa of the tongue, floor of mouth, hard and soft palate, gingival and buccal mucosa, retromolar trigone and posterior pharyngeal wall.  Palpation of floor and tongue base are negative.  He cannot tolerate mirror exam.  Examination of the neck reveals a well-healed incision in the midline of the neck.  When I palpate the right parotid tail aspect, there is what appears to be a nodule, although it is difficult to tell if this is just a partially sialadenotic or inflamed part of the gland.   The rest of the neck is unremarkable.  There is no lymphadenopathy or mass in the left parotid gland.      PROCEDURE:  Flexible endoscopy is performed through the right nasal cavity after anesthetization and vasoconstriction.  This reveals a normal nasopharynx and oropharynx. In viewing the hypopharynx and larynx, his arytenoids are quite prominent with the right one falling a little bit in front of the left one.  His glottic opening is somewhat slit-like and this is typical for a supracricoid. I am able to get the scope just to the level of the aperture and see into the trachea, which appears normal.  No lesions were seen.      IMPRESSION:     1) No evidence of malignant lesion.   2)  Foreign body sensation with swallowing.       PLAN:   1.  I would like to get a video swallow to see if there is any tightness below the area that I can see that may require dilation at this point. Further investigation might include a CT scan, but I do not see any evidence of any lesions today.   2.  I also encouraged the patient to make sure he lets me know if the right parotid nodule does not decrease in size in the next one to two weeks, at which point he would need a needle biopsy of this and perhaps imaging.         Again, thank you for allowing me to participate in the care of your patient.      Sincerely,    Trav Machado MD

## 2020-06-03 NOTE — PROGRESS NOTES
"   05/27/20 1515   General Information   Type Of Visit Initial   Start Of Care Date 05/27/20   Referring Physician Dr. 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 Pt most recently participated in a VFSS in 12/2017 which demonstrated mild pharyngeal dysphagia. He was seen in conjunction with ENT clinic today per MD request due to report of feeling of foreign body when he swallows. Pt reported he his new swallowing difficulties began approximately 1-1.5 months ago. Stated prior to that, his swallow had been relatively unchanged since his surgery. Endorsed he eats regular textures and thin liquids. Stated foods that are crumbly such as chips tend to give him the most difficulty and can cause coughing at times. Reported he also coughs when eating ice cream. Stated drinking water can cause him to cough as well and milk seems easier to swallow. Reported coughing is not consistent and occurs more frequently when he is not paying attention while eating/drinking. Denied recent pneumonias, unintentional weight loss, difficulty swallowing pills although he stated all of his pills are small. Denied odynophagia. Reported his swallow seems to get \"blocked off\" at times and feels to be stiffer than it was but, again, this is not consistent.   Respiratory Status Room air   Prior Level Of Function Swallowing   Prior Level Of Function Comment Regular textures, thin liquids   General Observations Pt pleasant and " cooperative throughout evaluation   Patient/family Goals To eat and drink without difficulties   Clinical Swallow Evaluation   Oral Musculature generally intact   Structural Abnormalities present  (s/p supracricoid laryngectomy )   Dentition present and adequate   Mucosal Quality adequate   Mandibular Strength and Mobility intact   Oral Labial Strength and Mobility WFL   Lingual Strength and Mobility WFL   Velar Elevation intact   Buccal Strength and Mobility intact   Laryngeal Function Swallow;Voicing initiated   Oral Musculature Comments Voice remains hoarse but this is consistent with baseline from surgery. Otherwise oral motor exam is WFL. Central neck feels stiff; suspect change from radiation therapy.    Additional Documentation Yes   Clinical Swallow Eval: Thin Liquid Texture Trial   Mode of Presentation, Thin Liquids cup;self-fed   Volume of Liquid or Food Presented ~3 oz   Oral Phase of Swallow WFL   Pharyngeal Phase of Swallow intact   Diagnostic Statement No overt s/sx of aspiration with thin liquids. Present hyolaryngeal elevation.    Clinical Swallow Eval: Solid Food Texture Trial   Mode of Presentation, Solid self-fed   Volume of Solid Food Presented x1 chaya doone cookie   Oral Phase, Solid WFL   Pharyngeal Phase, Solid coughing/choking   Diagnostic Statement 1x delayed cough after bite of cookie while talking. No other s/sx of aspiration.    Swallow Compensations   Swallow Compensations No compensations were used   Educational Assessment   Barriers to Learning No barriers   Esophageal Phase of Swallow   Patient reports or presents with symptoms of esophageal dysphagia Yes   Esophageal comments MD concerned about narrowing.    General Therapy Interventions   Planned Therapy Interventions Dysphagia Treatment   Dysphagia treatment Oropharyngeal exercise training;Modified diet education;Instruction of safe swallow strategies;Compensatory strategies for swallowing   Swallow Eval: Clinical Impressions    Skilled Criteria for Therapy Intervention Skilled criteria met.  Treatment indicated.   Functional Assessment Scale (FAS) 5   Treatment Diagnosis Mild pharyngeal dysphagia   Diet texture recommendations Thin liquids;Regular diet  (soft/moist regular textures)   Recommended Feeding/Eating Techniques alternate between small bites and sips of food/liquid;maintain upright posture during/after eating for 30 mins;small sips/bites;other (see comments)  (oral cares)   Rehab Potential good, to achieve stated therapy goals   Therapy Frequency other (see comments)  (TBD after completion of VFSS)   Anticipated Discharge Disposition home w/ outpatient services   Risks and Benefits of Treatment have been explained. Yes   Patient, family and/or staff in agreement with Plan of Care Yes   Clinical Impression Comments Pt presents with mild pharyngeal dysphagia s/p radiation therapy and subsequent supracricoid laryngectomy with CHEP approximately 7 years ago. He reports feeling of globus sensation in his throat that has been present for a number of weeks. He endorses some coughing with thin liquids and dry particulate foods. He demonstrates no overt s/sx of aspiration with thin liquids today; 1x delayed cough present when talking after eating dry cookie but no other s/sx of aspiration. He denied feeling of pharyngeal stasis. Question if patient's difficulties are related to late effects of radiation and radiation induced fibrosis vs possible narrowing. Recommend completion of VFSS with esophagram. In the meantime, recommend continue soft/regular textures and thin liquids with general strategies such as small bites/sips, alternate solids/liquids, excellent oral cares.    Swallow Goals   SLP Swallow Goals 1;2   Swallow Goal 1   Goal Identifier Diet   Goal Description 1. Pt will tolerate soft, moist regular textures and thin liquids with independent use of swallowing strategies and without overt s/sx of aspiration in at least 90% of PO  trials.    Target Date 08/25/20   Swallow Goal 2   Goal Identifier Exercises   Goal Description 2. Pt will improve and/or maintain ROM and strength of BOT, pharynx and jaw by completing 10 repetitions of 5/5 swallowing exercises at least daily when given minimal written or verbal cues   Target Date 08/25/20   Total Session Time   SLP Eval: oral/pharyngeal swallow function, clinical minutes (42479) 10   Total Evaluation Time 10     Thank you for the referral of Goldy Garvin. If you have any questions about this report, please contact me using the information below.     Roseann Peterson MA, CCC-SLP  Speech-Language Pathologist   [facility]  Department of Otolaryngology/D&T - 4th floor  Pager: [phone]  Phone: [phone]  Email: [email or web address]

## 2020-06-26 ENCOUNTER — TELEPHONE (OUTPATIENT)
Dept: OTOLARYNGOLOGY | Facility: CLINIC | Age: 70
End: 2020-06-26

## 2020-06-26 DIAGNOSIS — K11.8 PAROTID MASS: Primary | ICD-10-CM

## 2020-06-26 NOTE — TELEPHONE ENCOUNTER
[facility] Call Center    Phone Message    May a detailed message be left on voicemail: yes     Reason for Call: Other: Pt's Wife calling to let Dr Machado know that lump in neck was a size of a quarter still there  , she would like a call back to discuss next steps for his care  Please call Pt's wife back to discuss  Thank you,    Action Taken: Message routed to:  [facility] ENT    Travel Screening: Not Applicable

## 2020-06-29 NOTE — TELEPHONE ENCOUNTER
[facility] Call Center    Phone Message    May a detailed message be left on voicemail: yes     Reason for Call: Other: Pt's wife Vianca calling to f/u on request from Friday, 6/26. She states pt is anxious to be seen in clinic; she thinks pt will need a biopsy, per their earlier conversation in clinic. She notes they have time this week if provider is available.     Action Taken: Message routed to:  [facility] ENT    Travel Screening: Not Applicable

## 2020-06-29 NOTE — TELEPHONE ENCOUNTER
Returned call to patient's wife. She states patient continues to have a right parotid nodule which has not gotten smaller since his last visit with Dr. Machado. Patient would like to move forward with CT scan and possible biopsy if Dr. Machado feels this would be indicated.    Patient will start with CT scan. He would like to get this with his PCP clinic. Order placed and wife will contact local imaging to schedule. She will contact writer once she has scheduled scan.     Wife and patient agreeable to plan and will call with further questions or concerns.     La Jesus, RN, BSN

## 2020-07-02 ENCOUNTER — PATIENT OUTREACH (OUTPATIENT)
Dept: OTOLARYNGOLOGY | Facility: CLINIC | Age: 70
End: 2020-07-02

## 2020-07-02 NOTE — PROGRESS NOTES
Called patient's wife and left message to ensure that he is scheduled for CT locally.     Left direct line for return call.     La Jesus, RN, BSN

## 2020-07-06 ENCOUNTER — TRANSFERRED RECORDS (OUTPATIENT)
Dept: HEALTH INFORMATION MANAGEMENT | Facility: CLINIC | Age: 70
End: 2020-07-06

## 2020-07-06 ENCOUNTER — TELEPHONE (OUTPATIENT)
Dept: OTOLARYNGOLOGY | Facility: CLINIC | Age: 70
End: 2020-07-06

## 2020-07-06 NOTE — TELEPHONE ENCOUNTER
Records Requested  07/06/20    Facility  [facility]    [address] [place] [address]    [phone]     Outcome 7/6/2020 CT Neck report will be faxed or available in Care everywhere when ready, called for images to be pushed to [facility] PACS

## 2020-07-08 ENCOUNTER — PATIENT OUTREACH (OUTPATIENT)
Dept: OTOLARYNGOLOGY | Facility: CLINIC | Age: 70
End: 2020-07-08

## 2020-07-08 DIAGNOSIS — R22.1 NECK MASS: Primary | ICD-10-CM

## 2020-07-08 NOTE — PROGRESS NOTES
Called and spoke with patient's wife regarding the following CT neck results:    IMPRESSION:  1. 1.7 x 1.5 x 1.1 cm mass within the deep lobe of the right parotid gland  which is slowly increased in size from previous exam of 07/30/2018 when it  measured 1.0 x 1.0 x 0.8 cm.  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  This could be biopsied under ultrasound guidance  if clinically warranted.  2. No evidence of cervical or supraclavicular adenopathy.      Dr. Machado would like to have patient obtain IR guided biopsy of this mass seen. Patient and a wife agreeable to this plan. We will have IR approve biopsy and will contact patient when this can be scheduled.     Patient and wife encouraged to call with further questions or concerns.     La Jesus, RN, BSN

## 2020-07-09 ENCOUNTER — PATIENT OUTREACH (OUTPATIENT)
Dept: OTOLARYNGOLOGY | Facility: CLINIC | Age: 70
End: 2020-07-09

## 2020-07-10 ENCOUNTER — PATIENT OUTREACH (OUTPATIENT)
Dept: OTOLARYNGOLOGY | Facility: CLINIC | Age: 70
End: 2020-07-10

## 2020-07-10 NOTE — PROGRESS NOTES
Called and left message for patient's wife indicating that we are still awaiting approval for biopsy with interventional radiology. Once approved with will move forward with scheduling and we will contact them with date and time.     Left direct line for any further questions or concerns.     La Jesus, RN, BSN

## 2020-07-14 ENCOUNTER — PATIENT OUTREACH (OUTPATIENT)
Dept: OTOLARYNGOLOGY | Facility: CLINIC | Age: 70
End: 2020-07-14

## 2020-07-14 DIAGNOSIS — R22.1 NECK MASS: Primary | ICD-10-CM

## 2020-07-14 DIAGNOSIS — Z11.59 ENCOUNTER FOR SCREENING FOR OTHER VIRAL DISEASES: Primary | ICD-10-CM

## 2020-07-14 NOTE — PROGRESS NOTES
Called and spoke with patient and wife to let them know that IR has reviewed and approved biopsy. Biopsy can be scheduled for 7/23 at 8:00am arrival. Patient and wife agreeable to this date and time.     Reviewed with patient and wife date, time, location, and prep information. Reviewed the need for  and COVID testing. Patient and wife will contact PCP to arrange COVID testing within 2-3 days prior to procedure.     Reviewed that results take approximately 3-5 days and we will contact them once these are received.     Patient and wife verbalized understanding and were encouraged to call with further questions or concerns.     Surg path orders placed.     La Jesus, RN, BSN

## 2020-07-20 ENCOUNTER — TELEPHONE (OUTPATIENT)
Dept: INTERVENTIONAL RADIOLOGY/VASCULAR | Facility: CLINIC | Age: 70
End: 2020-07-20

## 2020-07-21 ENCOUNTER — TELEPHONE (OUTPATIENT)
Dept: INTERVENTIONAL RADIOLOGY/VASCULAR | Facility: CLINIC | Age: 70
End: 2020-07-21

## 2020-07-22 ENCOUNTER — PATIENT OUTREACH (OUTPATIENT)
Dept: OTOLARYNGOLOGY | Facility: CLINIC | Age: 70
End: 2020-07-22

## 2020-07-22 NOTE — TELEPHONE ENCOUNTER
Called patient's wife to ensure they received covid results from local PCP as writer received call requesting these from IR.     Wife states that covid testing was done on Monday and they received results today stating they were negative. Wife indicates local clinic was faxing results to the IR department.     Updated IR with above information.      La Jesus, RN, BSN     Vaccine Information Statement(s) was given today. This has been reviewed, questions answered, and verbal consent given by Patient for injection(s) and administration of Influenza (Inactivated).     1. Does the patient have a moderate to severe fever?  No  2. Has the patient had a serious reaction to a flu shot before?   No  3. Has the patient ever had [facility] Syndrome within 6 weeks of a previous flu shot?  No  4. Does the patient have a serious allergy to eggs?  No  5. Is the patient less that 6 months of age?  No     Patient is eligible to receive the vaccine based on all questions being answered as 'No'.     Patient tolerated without incident. See immunization grid for documentation.

## 2020-07-23 ENCOUNTER — HOSPITAL ENCOUNTER (OUTPATIENT)
Facility: CLINIC | Age: 70
Discharge: HOME | End: 2020-07-23
Attending: OTOLARYNGOLOGY | Admitting: RADIOLOGY
Payer: MEDICARE

## 2020-07-23 ENCOUNTER — APPOINTMENT (OUTPATIENT)
Dept: MEDSURG UNIT | Facility: CLINIC | Age: 70
End: 2020-07-23
Attending: OTOLARYNGOLOGY
Payer: MEDICARE

## 2020-07-23 ENCOUNTER — APPOINTMENT (OUTPATIENT)
Dept: INTERVENTIONAL RADIOLOGY/VASCULAR | Facility: CLINIC | Age: 70
End: 2020-07-23
Attending: OTOLARYNGOLOGY
Payer: MEDICARE

## 2020-07-23 VITALS
SYSTOLIC BLOOD PRESSURE: 116 MMHG | TEMPERATURE: 97.7 F | OXYGEN SATURATION: 95 % | DIASTOLIC BLOOD PRESSURE: 66 MMHG | RESPIRATION RATE: 16 BRPM

## 2020-07-23 DIAGNOSIS — R22.1 NECK MASS: ICD-10-CM

## 2020-07-23 LAB
BASOPHILS # BLD AUTO: 0.1 10E9/L (ref 0–0.2)
BASOPHILS NFR BLD AUTO: 1.6 %
DIFFERENTIAL METHOD BLD: NORMAL
EOSINOPHIL # BLD AUTO: 0.4 10E9/L (ref 0–0.7)
EOSINOPHIL NFR BLD AUTO: 5.7 %
ERYTHROCYTE [DISTWIDTH] IN BLOOD BY AUTOMATED COUNT: 13.1 % (ref 10–15)
HCT VFR BLD AUTO: 45.7 % (ref 40–53)
HGB BLD-MCNC: 15.8 G/DL (ref 13.3–17.7)
IMM GRANULOCYTES # BLD: 0.1 10E9/L (ref 0–0.4)
IMM GRANULOCYTES NFR BLD: 0.7 %
INR PPP: 1.08 (ref 0.86–1.14)
LYMPHOCYTES # BLD AUTO: 1.3 10E9/L (ref 0.8–5.3)
LYMPHOCYTES NFR BLD AUTO: 17.2 %
MCH RBC QN AUTO: 30.4 PG (ref 26.5–33)
MCHC RBC AUTO-ENTMCNC: 34.6 G/DL (ref 31.5–36.5)
MCV RBC AUTO: 88 FL (ref 78–100)
MONOCYTES # BLD AUTO: 0.7 10E9/L (ref 0–1.3)
MONOCYTES NFR BLD AUTO: 9.5 %
NEUTROPHILS # BLD AUTO: 5 10E9/L (ref 1.6–8.3)
NEUTROPHILS NFR BLD AUTO: 65.3 %
NRBC # BLD AUTO: 0 10*3/UL
NRBC BLD AUTO-RTO: 0 /100
PLATELET # BLD AUTO: 185 10E9/L (ref 150–450)
RBC # BLD AUTO: 5.19 10E12/L (ref 4.4–5.9)
WBC # BLD AUTO: 7.7 10E9/L (ref 4–11)

## 2020-07-23 PROCEDURE — 00000467 ZZHCL STATISTIC CYTO FNA IM TC STAT 88172: Performed by: OTOLARYNGOLOGY

## 2020-07-23 PROCEDURE — 93010 ELECTROCARDIOGRAM REPORT: CPT | Mod: 59 | Performed by: INTERNAL MEDICINE

## 2020-07-23 PROCEDURE — 25800030 ZZH RX IP 258 OP 636: Performed by: RADIOLOGY

## 2020-07-23 PROCEDURE — 93005 ELECTROCARDIOGRAM TRACING: CPT

## 2020-07-23 PROCEDURE — 25000128 H RX IP 250 OP 636: Performed by: RADIOLOGY

## 2020-07-23 PROCEDURE — 25000125 ZZHC RX 250: Performed by: RADIOLOGY

## 2020-07-23 PROCEDURE — 99152 MOD SED SAME PHYS/QHP 5/>YRS: CPT

## 2020-07-23 PROCEDURE — 40000065 ZZH STATISTIC EKG NON-CHARGEABLE

## 2020-07-23 PROCEDURE — 99153 MOD SED SAME PHYS/QHP EA: CPT

## 2020-07-23 PROCEDURE — 88305 TISSUE EXAM BY PATHOLOGIST: CPT | Performed by: OTOLARYNGOLOGY

## 2020-07-23 PROCEDURE — 85025 COMPLETE CBC W/AUTO DIFF WBC: CPT | Performed by: RADIOLOGY

## 2020-07-23 PROCEDURE — 40000166 ZZH STATISTIC PP CARE STAGE 1

## 2020-07-23 PROCEDURE — 76942 ECHO GUIDE FOR BIOPSY: CPT

## 2020-07-23 PROCEDURE — 85610 PROTHROMBIN TIME: CPT | Mod: GZ | Performed by: RADIOLOGY

## 2020-07-23 PROCEDURE — 88173 CYTOPATH EVAL FNA REPORT: CPT | Performed by: OTOLARYNGOLOGY

## 2020-07-23 PROCEDURE — 00000155 ZZHCL STATISTIC H-CELL BLOCK W/STAIN: Performed by: OTOLARYNGOLOGY

## 2020-07-23 RX ORDER — FLUMAZENIL 0.1 MG/ML
0.2 INJECTION, SOLUTION INTRAVENOUS
Status: DISCONTINUED | OUTPATIENT
Start: 2020-07-23 | End: 2020-07-23 | Stop reason: HOSPADM

## 2020-07-23 RX ORDER — DEXTROSE MONOHYDRATE 25 G/50ML
25-50 INJECTION, SOLUTION INTRAVENOUS
Status: DISCONTINUED | OUTPATIENT
Start: 2020-07-23 | End: 2020-07-23 | Stop reason: HOSPADM

## 2020-07-23 RX ORDER — FENTANYL CITRATE 50 UG/ML
25-50 INJECTION, SOLUTION INTRAMUSCULAR; INTRAVENOUS EVERY 5 MIN PRN
Status: DISCONTINUED | OUTPATIENT
Start: 2020-07-23 | End: 2020-07-23 | Stop reason: HOSPADM

## 2020-07-23 RX ORDER — LIDOCAINE 40 MG/G
CREAM TOPICAL
Status: DISCONTINUED | OUTPATIENT
Start: 2020-07-23 | End: 2020-07-23 | Stop reason: HOSPADM

## 2020-07-23 RX ORDER — SODIUM CHLORIDE 9 MG/ML
INJECTION, SOLUTION INTRAVENOUS CONTINUOUS
Status: DISCONTINUED | OUTPATIENT
Start: 2020-07-23 | End: 2020-07-23 | Stop reason: HOSPADM

## 2020-07-23 RX ORDER — NICOTINE POLACRILEX 4 MG
15-30 LOZENGE BUCCAL
Status: DISCONTINUED | OUTPATIENT
Start: 2020-07-23 | End: 2020-07-23 | Stop reason: HOSPADM

## 2020-07-23 RX ORDER — NALOXONE HYDROCHLORIDE 0.4 MG/ML
.1-.4 INJECTION, SOLUTION INTRAMUSCULAR; INTRAVENOUS; SUBCUTANEOUS
Status: DISCONTINUED | OUTPATIENT
Start: 2020-07-23 | End: 2020-07-23 | Stop reason: HOSPADM

## 2020-07-23 RX ADMIN — LIDOCAINE HYDROCHLORIDE 6 ML: 10 INJECTION, SOLUTION EPIDURAL; INFILTRATION; INTRACAUDAL; PERINEURAL at 09:49

## 2020-07-23 RX ADMIN — MIDAZOLAM 1 MG: 1 INJECTION INTRAMUSCULAR; INTRAVENOUS at 09:51

## 2020-07-23 RX ADMIN — MIDAZOLAM 1 MG: 1 INJECTION INTRAMUSCULAR; INTRAVENOUS at 09:56

## 2020-07-23 RX ADMIN — FENTANYL CITRATE 50 MCG: 50 INJECTION, SOLUTION INTRAMUSCULAR; INTRAVENOUS at 09:52

## 2020-07-23 RX ADMIN — FENTANYL CITRATE 50 MCG: 50 INJECTION, SOLUTION INTRAMUSCULAR; INTRAVENOUS at 09:57

## 2020-07-23 RX ADMIN — SODIUM CHLORIDE: 9 INJECTION, SOLUTION INTRAVENOUS at 08:35

## 2020-07-23 NOTE — PROGRESS NOTES
Patient returned to 2A post parotid biopsy.  VSS.  Right neck C/D/I without hematoma.  Patient reporting dull ache/mild pain at site.  Ice pack applied with good relief.  PO food and fluids at bedside.  1 hr bedrest.  Patient aware.

## 2020-07-23 NOTE — SEDATION DOCUMENTATION
Interventional Radiology Pre-Procedure Sedation Assessment   Time of Assessment: 9:20 AM    Expected Level: Moderate Sedation    Indication: Sedation is required for the following type of Procedure: Biopsy    Sedation and procedural consent: Risks, benefits and alternatives were discussed with Patient    PO Intake: Appropriately NPO for procedure    ASA Class: Class 2 - MILD SYSTEMIC DISEASE, NO ACUTE PROBLEMS, NO FUNCTIONAL LIMITATIONS.    Mallampati: Not well seen.    Lungs: Lungs Clear with good breath sounds bilaterally    Heart: Normal heart sounds and rate    History and physical reviewed and updates include review of the systems and allergies, and physical examination.. I have reviewed the lab findings, diagnostic data, medications, and the plan for sedation. I have determined this patient to be an appropriate candidate for the planned sedation and procedure and have reassessed the patient IMMEDIATELY PRIOR to sedation and procedure.    Char Maria MD

## 2020-07-23 NOTE — IP AVS SNAPSHOT
MRN:[id number]                      After Visit Summary   7/23/2020    Goldy Garvin    MRN: [id number]           Visit Information        Department      7/23/2020  7:46 AM [address] [facility]          Review of your medicines      UNREVIEWED medicines. Ask your doctor about these medicines       Dose / Directions   acetaminophen 500 MG tablet  Commonly known as:  TYLENOL      Dose:  500-1,000 mg  Take 500-1,000 mg by mouth  Refills:  0     albuterol 108 (90 Base) MCG/ACT inhaler  Commonly known as:  PROAIR HFA/PROVENTIL HFA/VENTOLIN HFA      Dose:  1-2 puff  Inhale 1-2 puffs into the lungs  Refills:  0     amLODIPine 2.5 MG tablet  Commonly known as:  NORVASC      Dose:  2.5 mg  Take 2.5 mg by mouth  Refills:  0     ASPIRIN PO      Dose:  162 mg  Take 162 mg by mouth daily  Refills:  0     atenolol-chlorthalidone 50-25 MG tablet  Commonly known as:  TENORETIC      Dose:  1 tablet  Take 1 tablet by mouth  Refills:  0     atorvastatin 80 MG tablet  Commonly known as:  LIPITOR      Dose:  20 mg  20 mg by Per Feeding Tube route 4 times daily  Refills:  0     ciprofloxacin 500 MG tablet  Commonly known as:  CIPRO  Ask about: Should I take this medication?      Dose:  500 mg  Take 500 mg by mouth  Refills:  0     clindamycin 150 MG capsule  Commonly known as:  CLEOCIN  Ask about: Should I take this medication?      Dose:  450 mg  Take 450 mg by mouth  Refills:  0     LISINOPRIL PO      Dose:  10 mg  Take 10 mg by mouth daily  Refills:  0     MULTI-VITAMINS PO      Take by mouth daily  Refills:  0     nitroGLYcerin 0.4 MG sublingual tablet  Commonly known as:  NITROSTAT      Dose:  0.4 mg  Place 0.4 mg under the tongue  Refills:  0     omeprazole 10 MG DR capsule  Commonly known as:  priLOSEC      Dose:  20 mg  Take 20 mg by mouth  Refills:  0     POTASSIUM CHLORIDE ER PO      Dose:  10 mg  Take 10 mg by mouth 2 times daily  Refills:  0     PRILOSEC PO      Dose:  20 mg  Take 20 mg by mouth 2 times  daily (before meals)  Refills:  0     tamsulosin 0.4 MG capsule  Commonly known as:  FLOMAX      Dose:  0.4 mg  Take 0.4 mg by mouth  Refills:  0        CONTINUE these medicines which have NOT CHANGED       Dose / Directions   FISH OIL PO      Refills:  0              Protect others around you: Learn how to safely use, store and throw away your medicines at www.disposemymeds.org.       Follow-ups after your visit       Care Instructions       Further instructions from your care team       [facility]    Interventional Radiology  Patient Instructions Following Parotid Biopsy    AFTER YOU GO HOME  ? If you were given sedation DO NOT drive or operate machinery at home or at work for at least 24 hours  ? DO relax and take it easy for 48 hours, no strenuous activity for 24 hours  ? DO drink plenty of fluids  ? DO resume your regular diet, unless otherwise instructed by your Primary Physician  ? Keep the dressing dry and in place for 24 hours.  ? DO NOT SMOKE FOR AT LEAST 24 HOURS, if you have been given any medications that were to help you relax or sedate you during your procedure  ? DO NOT drink alcoholic beverages the day of your procedure  ? DO NOT do any strenuous exercise or lifting (> 10 lbs) for at least 7 days following your procedure  ? DO NOT take a bath or shower for at least 12 hours following your procedure  ? Remove dressing after shower the next day. Replace with Band aid for 2 days.  Never leave a wet dressing in place.  ? DO NOT make any important or legal decisions for 24 hours following your procedure  ? There should be minimum drainage from the biopsy site    CALL THE PHYSICIAN IF:  ? You start bleeding from the procedure site.  If you do start to bleed from that site, lie down flat and hold pressure on the site for a minimum of 10 minutes.  Your physician will tell you if you need to return to the hospital  ? You develop nausea or vomiting  ? You have excessive swelling, redness,  or tenderness at the site  ? You have drainage that looks like it is infected.  ? You experience severe pain  ? You develop hives or a rash or unexplained itching  ? You develop shortness of breath  ? You develop a temperature of 101 degrees F or greater    ADDITIONAL INSTRUCTIONS  If you are taking Coumadin, restart tonight.  Follow up with your Coumadin Clinic or Primary Care MD to have your INR rechecked.    [facility] INTERVENTIONAL RADIOLOGY DEPARTMENT  Procedure Physician:  Dr. Maria and Dr. Bond                                   Date of procedure: July 23, 2020  Telephone Numbers: [phone] Monday-Friday 8:00 am to 4:30 pm  [phone] After 4:30 pm Monday-Friday, Weekends & Holidays.   Ask for the Neuro Radiologist on call.  Someone is on call 24 hrs/day  [facility] toll free number: [phone] Monday-Friday 8:00 am to 4:30 pm  [facility] Emergency Dept: [phone]          Additional Information About Your Visit       [employer] Information    [employer] gives you secure access to your electronic health record. If you see a primary care provider, you can also send messages to your care team and make appointments. If you have questions, please call your primary care clinic.  If you do not have a primary care provider, please call [phone] and they will assist you.       Care EveryWhere ID    This is your Care EveryWhere ID. This could be used by other organizations to access your [facility] medical records  [id number]       Your Vitals Were  Most recent update: 7/23/2020 11:01 AM    Blood Pressure   135/64 (BP Location: Right arm)    Temperature   97.7  F (36.5  C) (Oral)    Respirations   16    Pulse Oximetry   98%           Primary Care Provider Office Phone # Fax #    Juan Alberto Garcia [phone]      Equal Access to Services    ANNA MARIE DYE : Shea whitfield Soazul, waaxda ivonne, qaybta kaalmajade liriano, mk campos. So [facility] [phone].    ATENCIÓN:  Si habvarun bocanegra, tiene a soriano disposición servicios gratuitos de asistencia lingüística. Alireza zuluaga [phone].    We comply with applicable federal and state civil rights laws, including the [place] Human Rights Act. We do not discriminate on the basis of race, color, creed, Hindu, national origin, marital status, age, disability, sex, sexual orientation, or gender identity.       Thank you!    Thank you for choosing [facility] for your care. Our goal is always to provide you with excellent care. Hearing back from our patients is one way we can continue to improve our services. Please take a few minutes to complete the written survey that you may receive in the mail after you visit with us. Thank you!            Medication List      Medications          Morning Afternoon Evening Bedtime As Needed    FISH OIL PO                       ASK your doctor about these medications          Morning Afternoon Evening Bedtime As Needed    acetaminophen 500 MG tablet  Also known as:  TYLENOL  INSTRUCTIONS:  Take 500-1,000 mg by mouth                     albuterol 108 (90 Base) MCG/ACT inhaler  Also known as:  PROAIR HFA/PROVENTIL HFA/VENTOLIN HFA  INSTRUCTIONS:  Inhale 1-2 puffs into the lungs  Doctor's comments:  Pharmacy may dispense brand covered by insurance (Proair, or proventil or ventolin or generic albuterol inhaler)                     amLODIPine 2.5 MG tablet  Also known as:  NORVASC  INSTRUCTIONS:  Take 2.5 mg by mouth                     ASPIRIN PO  INSTRUCTIONS:  Take 162 mg by mouth daily                     atenolol-chlorthalidone 50-25 MG tablet  Also known as:  TENORETIC  INSTRUCTIONS:  Take 1 tablet by mouth                     atorvastatin 80 MG tablet  Also known as:  LIPITOR  INSTRUCTIONS:  20 mg by Per Feeding Tube route 4 times daily                     ciprofloxacin 500 MG tablet  Also known as:  CIPRO  INSTRUCTIONS:  Take 500 mg by mouth  Ask about: Should I take this medication?                      clindamycin 150 MG capsule  Also known as:  CLEOCIN  INSTRUCTIONS:  Take 450 mg by mouth  Ask about: Should I take this medication?                     LISINOPRIL PO  INSTRUCTIONS:  Take 10 mg by mouth daily                     MULTI-VITAMINS PO  INSTRUCTIONS:  Take by mouth daily                     nitroGLYcerin 0.4 MG sublingual tablet  Also known as:  NITROSTAT  INSTRUCTIONS:  Place 0.4 mg under the tongue                     omeprazole 10 MG DR capsule  Also known as:  priLOSEC  INSTRUCTIONS:  Take 20 mg by mouth                     POTASSIUM CHLORIDE ER PO  INSTRUCTIONS:  Take 10 mg by mouth 2 times daily                     PRILOSEC PO  INSTRUCTIONS:  Take 20 mg by mouth 2 times daily (before meals)                     tamsulosin 0.4 MG capsule  Also known as:  FLOMAX  INSTRUCTIONS:  Take 0.4 mg by mouth

## 2020-07-23 NOTE — DISCHARGE INSTRUCTIONS
[facility]    Interventional Radiology  Patient Instructions Following Parotid Biopsy    AFTER YOU GO HOME  ? If you were given sedation DO NOT drive or operate machinery at home or at work for at least 24 hours  ? DO relax and take it easy for 48 hours, no strenuous activity for 24 hours  ? DO drink plenty of fluids  ? DO resume your regular diet, unless otherwise instructed by your Primary Physician  ? Keep the dressing dry and in place for 24 hours.  ? DO NOT SMOKE FOR AT LEAST 24 HOURS, if you have been given any medications that were to help you relax or sedate you during your procedure  ? DO NOT drink alcoholic beverages the day of your procedure  ? DO NOT do any strenuous exercise or lifting (> 10 lbs) for at least 7 days following your procedure  ? DO NOT take a bath or shower for at least 12 hours following your procedure  ? Remove dressing after shower the next day. Replace with Band aid for 2 days.  Never leave a wet dressing in place.  ? DO NOT make any important or legal decisions for 24 hours following your procedure  ? There should be minimum drainage from the biopsy site    CALL THE PHYSICIAN IF:  ? You start bleeding from the procedure site.  If you do start to bleed from that site, lie down flat and hold pressure on the site for a minimum of 10 minutes.  Your physician will tell you if you need to return to the hospital  ? You develop nausea or vomiting  ? You have excessive swelling, redness, or tenderness at the site  ? You have drainage that looks like it is infected.  ? You experience severe pain  ? You develop hives or a rash or unexplained itching  ? You develop shortness of breath  ? You develop a temperature of 101 degrees F or greater    ADDITIONAL INSTRUCTIONS  If you are taking Coumadin, restart tonight.  Follow up with your Coumadin Clinic or Primary Care MD to have your INR rechecked.    [facility] INTERVENTIONAL RADIOLOGY DEPARTMENT  Procedure Physician:  Dr. Maria and  Dr. Bond                                   Date of procedure: July 23, 2020  Telephone Numbers: [phone] Monday-Friday 8:00 am to 4:30 pm  [phone] After 4:30 pm Monday-Friday, Weekends & Holidays.   Ask for the Neuro Radiologist on call.  Someone is on call 24 hrs/day  [facility] toll free number: [phone] Monday-Friday 8:00 am to 4:30 pm  [facility] Emergency Dept: [phone]

## 2020-07-23 NOTE — PROGRESS NOTES
Patient arrived on 2A for right parotid biopsy.  IV placed, labs sent.  Consent being obtained currently.  Awaiting lab results, otherwise prep complete.

## 2020-07-23 NOTE — PROGRESS NOTES
Patient tolerated recovery stage well. VSS, right neck site clean/dry/intact, no hematoma, and mild pain controlled with ice pack. Patient tolerated PO food and fluids. Teaching was done and discharge instructions were given. Patient ambulated, voided, and PIV was removed. Patient discharged from the hospital to home with wife @ 1155.

## 2020-07-23 NOTE — IP AVS SNAPSHOT
[address] [address]                                    After Visit Summary   7/23/2020    Goldy Garvin    MRN: [id number]           After Visit Summary Signature Page    I have received my discharge instructions, and my questions have been answered. I have discussed any challenges I see with this plan with the nurse or doctor.    ..........................................................................................................................................  Patient/Patient Representative Signature      ..........................................................................................................................................  Patient Representative Print Name and Relationship to Patient    ..................................................               ................................................  Date                                   Time    ..........................................................................................................................................  Reviewed by Signature/Title    ...................................................              ..............................................  Date                                               Time          22EPIC Rev 08/18

## 2020-07-24 LAB
COPATH REPORT: NORMAL
INTERPRETATION ECG - MUSE: NORMAL

## 2020-07-30 ENCOUNTER — PATIENT OUTREACH (OUTPATIENT)
Dept: OTOLARYNGOLOGY | Facility: CLINIC | Age: 70
End: 2020-07-30

## 2020-07-30 NOTE — PROGRESS NOTES
Called and spoke with patient's wife with the following pathology results:    CYTOLOGIC INTERPRETATION:     Parotid, right tail mass, ultrasound guided fine needle aspiration:   - Nondiagnostic specimen   - Cyst contents(histiocytes and blood elements) only   Specimen Adequacy: Unsatisfactory for evaluation   Specimen processed and evaluated, but unsatisfactory for evaluation of   epithelial cell abnormality due to   scant cellularity.     Advised wife that Dr. Machado would recommend either repeat biopsy versus surgical excision. Patient and wife would like to possibly proceed with surgical excision but they would like to discuss and update writer. We will arrange for virtual visit with Dr. Machado if they decide to proceed with surgical excision so he can discuss with patient.     La Jesus, RN, BSN

## 2020-07-31 ENCOUNTER — TUMOR CONFERENCE (OUTPATIENT)
Dept: ONCOLOGY | Facility: CLINIC | Age: 70
End: 2020-07-31
Payer: MEDICARE

## 2020-07-31 NOTE — PROGRESS NOTES
Head & Neck Tumor Conference Note        Status: Established   Staff: Dr. Machado     Tumor Site: Glottic   Tumor Pathology: Squamous cell carcinoma   Tumor Stage: T1a or T1b   Tumor Treatment:   - Radiation therapy   - Supracricoid partial laryngectomy with cricohyoioepiglottopexy 2013    Reason for Review: Review imaging, path, and POC    Brief History: This is a 70 year old male with a history of glottic cancer now nearly seven years out from a supracricoid laryngectomy with CHEP following recurrence after radiation therapy for an early glottic cancer. He has required multiple trips to the operating room a few times afterwards to laser away some scar tissue and granulation tissue at the anterior aspect of the closure. In May 2020 he presented to ENT clinic with complaints of difficulty swallowing. There was no evidence of malignancy on exam thus a swallow study and Ct were recommended. Due to an increase in size of his parotid mass an FNA was performed. He is being discussed today to review this pathology      Pertinent PMH:   Past Medical History:   Diagnosis Date     Arthritis     in hands     Cancer (H)      COPD (chronic obstructive pulmonary disease) (H)      Coronary artery disease      Gastro-oesophageal reflux disease      Heart attack (H) 2002     History of radiation therapy      Hypertension      Migraines      Tinnitus         Smoking Hx:   Social History     Tobacco Use     Smoking status: Former Smoker     Packs/day: 0.50     Years: 5.00     Pack years: 2.50     Types: Cigarettes, Cigars     Start date: 1963     Last attempt to quit: 1975     Years since quittin.5     Smokeless tobacco: Never Used   Substance Use Topics     Alcohol use: Yes     Comment: very little     Drug use: No     Imaging:   CT soft tissue neck   IMPRESSION:  1. 1.7 x 1.5 x 1.1 cm mass within the deep lobe of the right parotid gland  which is slowly increased in size from previous exam of 2018 when  it  measured 1.0 x 1.0 x 0.8 cm.  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  This could be biopsied under ultrasound guidance  if clinically warranted.  2. No evidence of cervical or supraclavicular adenopathy.    Pathology:   FNA 7/23/2020  Parotid, right tail mass, ultrasound guided fine needle aspiration:   - Nondiagnostic specimen   - Cyst contents(histiocytes and blood elements) only   Specimen Adequacy: Unsatisfactory for evaluation   Specimen processed and evaluated, but unsatisfactory for evaluation of   epithelial cell abnormality due to   scant cellularity.     Tumor Board Recommendation:   Discussion: The patient's imaging and pathology were reviewed at conference today. Imaging demonstrated a centrally cystic lesion roughly 2 cm in size. This mass was appreciated on scans dating back to 2014 but at this time it was smaller and without central necrosis. It appears to have been slow growing. A recommendation was made for MRI scan to further characterize the lesion and determine if repeat biopsy is necessary vs surgical excision. This will be discussed with the patient.     Plan:   - Discuss MRI and possible FNA vs Surgical excision with the patient     Christiano Romo MD PGY-3  Otolaryngology- Head and Neck Surgery    Documentation / Disclaimer Cancer Tumor Board Note  Cancer tumor board recommendations do not override what is determined to be reasonable care and treatment, which is dependent on the circumstances of a patient's case; the patient's medical, social, and personal concerns; and the clinical judgment of the oncologist [physician].

## 2020-07-31 NOTE — PROGRESS NOTES
Called patient and wife to review tumor board discussion. Discussed the options with patient's wife of proceeding with MRI scan to evaluate the mass in more detail to determine if there is a solid component to biopsy versus proceeding with surgical excision.     Patient and wife wish to proceed with surgical excision as they will not be able to rest assured until it is removed and ensure this is nothing more conerning.     Advised that Dr. Machado will plan to complete excision with possibility of larger resection if it is determined to be malignant.     Reviewed pre-op and general post-op concerns with patient and wife. We will arrange for video visit to further discuss surgery on Wednesday with Dr Machado.       Case request will be entered and we will work on scheduling. We will call patient and wife to arrange date and time.     La Jesus, RN, BSN

## 2020-08-04 ENCOUNTER — PREP FOR PROCEDURE (OUTPATIENT)
Dept: OTOLARYNGOLOGY | Facility: CLINIC | Age: 70
End: 2020-08-04

## 2020-08-04 DIAGNOSIS — R22.1 NECK MASS: Primary | ICD-10-CM

## 2020-08-05 ENCOUNTER — VIRTUAL VISIT (OUTPATIENT)
Dept: OTOLARYNGOLOGY | Facility: CLINIC | Age: 70
End: 2020-08-05
Payer: MEDICARE

## 2020-08-05 VITALS — WEIGHT: 200 LBS | HEIGHT: 66 IN | BODY MASS INDEX: 32.14 KG/M2

## 2020-08-05 DIAGNOSIS — Z11.59 ENCOUNTER FOR SCREENING FOR OTHER VIRAL DISEASES: Primary | ICD-10-CM

## 2020-08-05 DIAGNOSIS — R22.1 NECK MASS: Primary | ICD-10-CM

## 2020-08-05 ASSESSMENT — MIFFLIN-ST. JEOR: SCORE: 1599.94

## 2020-08-05 ASSESSMENT — PAIN SCALES - GENERAL: PAINLEVEL: NO PAIN (0)

## 2020-08-05 NOTE — LETTER
"8/5/2020       RE: Goldy Garvin  [address]  [place] [address]     Dear Colleague,    Thank you for referring your patient, Goldy Garvin, to the [facility] EAR NOSE AND THROAT at [facility]. Please see a copy of my visit note below.    Goldy Garvin is a 70 year old male who is being evaluated via a billable telephone visit.      The patient has been notified of following:     \"This telephone visit will be conducted via a call between you and your physician/provider. We have found that certain health care needs can be provided without the need for a physical exam.  This service lets us provide the care you need with a short phone conversation.  If a prescription is necessary we can send it directly to your pharmacy.  If lab work is needed we can place an order for that and you can then stop by our lab to have the test done at a later time.    Telephone visits are billed at different rates depending on your insurance coverage. During this emergency period, for some insurers they may be billed the same as an in-person visit.  Please reach out to your insurance provider with any questions.    If during the course of the call the physician/provider feels a telephone visit is not appropriate, you will not be charged for this service.\"    Patient has given verbal consent for Telephone visit?  Yes    What phone number would you like to be contacted at? [phone]    How would you like to obtain your AVS? Mati    Phone call duration: *** minutes    {signature options:[id number]}        Again, thank you for allowing me to participate in the care of your patient.      Sincerely,    Trav Machado MD      "

## 2020-08-05 NOTE — PROGRESS NOTES
"Goldy Garvin is a 70 year old male who is being evaluated via a billable telephone visit.      The patient has been notified of following:     \"This telephone visit will be conducted via a call between you and your physician/provider. We have found that certain health care needs can be provided without the need for a physical exam.  This service lets us provide the care you need with a short phone conversation.  If a prescription is necessary we can send it directly to your pharmacy.  If lab work is needed we can place an order for that and you can then stop by our lab to have the test done at a later time.    Telephone visits are billed at different rates depending on your insurance coverage. During this emergency period, for some insurers they may be billed the same as an in-person visit.  Please reach out to your insurance provider with any questions.    If during the course of the call the physician/provider feels a telephone visit is not appropriate, you will not be charged for this service.\"    Patient has given verbal consent for Telephone visit?  Yes    What phone number would you like to be contacted at? [phone]    How would you like to obtain your AVS? Mati    Phone call duration: 15 minutes    Trav Machado      PRIOR ONCOLOGIC HISTORY:  Mr. Garvin is about seven years out from a supracricoid laryngectomy with CHEP following recurrence after radiation therapy for an early glottic cancer. I did have to take him to the operating room a few times afterwards to laser away some scar tissue and granulation tissue at the anterior aspect of the closure.  His breathing has been fairly stable since then.  His voice is quite hoarse, but he swallows and is able to communicate.  Recently, he was noted to have a lump in his right parotid on routine examination.  A CT scan suggested something in the inferior aspect of the parotid or near the junction of level II and I had not been able to feel it on a prior " exam.  It had just come up at that time in May and we wanted to keep an eye on this.      INTERVAL HISTORY:  The patient reports that it is still present.  He did have an image-guided biopsy done which was nondiagnostic and we reviewed this at our Tumor Board and elected just to remove it.  The patient notes that it has shrunk perhaps a little bit since that time during today's phone call, but he still feels it.   I mentioned to him that my suspicion that it is malignant is low, but we cannot rule that out with a nondiagnostic biopsy and therefore we should move forward with the biopsy.  We also discussed the fact that given his narrow airway, it might be worth trying to do some laser work at the same time to try to open things up and there was also a small risk of some postoperative swelling that might make extubation a little bit difficult, but I hope this will be mitigated by lasering some tissue at the same time.      IMPRESSION:  Right parotid tail mass, nondiagnostic biopsy.      PLAN:  The plan will be to take this out in the OR via a modified Chava type of approach from the inferior aspect of the parotid without dissecting the facial nerve or perhaps only dissected the inferior branches as necessary.  I discussed the risks with the patient and his wife on the phone today and they would like to move forward and this is scheduled for next week.

## 2020-08-05 NOTE — LETTER
"8/5/2020      RE: Goldy Garvin  [address]  [place] [address]       Goldy Garvin is a 70 year old male who is being evaluated via a billable telephone visit.      The patient has been notified of following:     \"This telephone visit will be conducted via a call between you and your physician/provider. We have found that certain health care needs can be provided without the need for a physical exam.  This service lets us provide the care you need with a short phone conversation.  If a prescription is necessary we can send it directly to your pharmacy.  If lab work is needed we can place an order for that and you can then stop by our lab to have the test done at a later time.    Telephone visits are billed at different rates depending on your insurance coverage. During this emergency period, for some insurers they may be billed the same as an in-person visit.  Please reach out to your insurance provider with any questions.    If during the course of the call the physician/provider feels a telephone visit is not appropriate, you will not be charged for this service.\"    Patient has given verbal consent for Telephone visit?  Yes    What phone number would you like to be contacted at? [phone]    How would you like to obtain your AVS? MyNextRun    Phone call duration: 15 minutes    Trav Wilson       Dictation on: 08/10/2020 11:45 AM by: TRAV WILSON [[id number]]             PRIOR ONCOLOGIC HISTORY:  Mr. Garvin is about seven years out from a supracricoid laryngectomy with CHEP following recurrence after radiation therapy for an early glottic cancer. I did have to take him to the operating room a few times afterwards to laser away some scar tissue and granulation tissue at the anterior aspect of the closure.  His breathing has been fairly stable since then.  His voice is quite hoarse, but he swallows and is able to communicate.  Recently, he was noted to have a lump in his right parotid on routine " examination.  A CT scan suggested something in the inferior aspect of the parotid or near the junction of level II and I had not been able to feel it on a prior exam.  It had just come up at that time in May and we wanted to keep an eye on this.      INTERVAL HISTORY:  The patient reports that it is still present.  He did have an image-guided biopsy done which was nondiagnostic and we reviewed this at our Tumor Board and elected just to remove it.  The patient notes that it has shrunk perhaps a little bit since that time during today's phone call, but he still feels it.   I mentioned to him that my suspicion that it is malignant is low, but we cannot rule that out with a nondiagnostic biopsy and therefore we should move forward with the biopsy.  We also discussed the fact that given his narrow airway, it might be worth trying to do some laser work at the same time to try to open things up and there was also a small risk of some postoperative swelling that might make extubation a little bit difficult, but I hope this will be mitigated by lasering some tissue at the same time.      IMPRESSION:  Right parotid tail mass, nondiagnostic biopsy.      PLAN:  The plan will be to take this out in the OR via a modified Chava type of approach from the inferior aspect of the parotid without dissecting the facial nerve or perhaps only dissected the inferior branches as necessary.  I discussed the risks with the patient and his wife on the phone today and they would like to move forward and this is scheduled for next week.      Trav Machado MD

## 2020-08-06 ENCOUNTER — PREP FOR PROCEDURE (OUTPATIENT)
Dept: OTOLARYNGOLOGY | Facility: CLINIC | Age: 70
End: 2020-08-06

## 2020-08-06 NOTE — OR NURSING
"Spoke with wife about pre-op instructions for Goldy's upcoming surgery 8/10/20. Informed of COVID policy that visitor may accompany to pre-op only but then need to leave facility when patient enters OR due to social distancing limitations and limited waiting space. Wife stated that this was not going to work as they live two hours away and she does not have anywhere to wait. RN informed wife that visitors have gone to their cars to wait or to local coffee shops but unfortunately, due to COVID policy, she cannot wait for patient in the hospital. Wife stated that she \"will need to talk to [place]),: Dr. Machado's RNCC regarding being able to wait since she live far away. She stated that she has seen people waiting down by the waiting rooms in the lobby. Wife informed again that staff should inform her that she cannot wait in the facility and it was likely that people waiting were waiting for procedures themselves. Informed wife that we will set her up for the text notifications so she will know when her  is done with surgery, when Dr. Machado will be contacting her, and when he is in recovery. Nurses will contact her to come back to the hospital to pick Goldy up and review discharge instructions.       "

## 2020-08-06 NOTE — NURSING NOTE
Teaching Flowsheet - ENT   Relevant Diagnosis:  Right neck mass  Teaching Topic: excision of neck mass and co2 laser   Person(s) involved in teaching: patient and wife       Motivation Level:  Asks Questions:   Yes  Eager to Learn:   Yes  Cooperative:   Yes  Receptive (willing/able to accept information):   Yes  Comments: Reviewed pre-op H and P,  NPO prior to  surgery,  pre-op scrub (given Hibiclens)  Reviewed post-op  cares , activity and pain.     Patient demonstrates understanding of the following:  Reason for the appointment, diagnosis and treatment plan:   Yes  Knowledge of proper use of medications and conditions for which they are ordered (with special attention to potential side effects or drug interactions):  stop aspirin products 1 week before surgery Yes  Which situations necessitate calling provider and whom to contact:   Yes  Nutritional needs and diet plan:   Yes  Pain management techniques:   Yes  Patient instructed on hand hygiene:  Yes  How and/when to access community resources:   Yes     Infection Prevention:  Patient   demonstrates understanding of the following:  Surgical procedure site care taught Yes  Signs and symptoms of infection taught Yes  Wound care taught Yes  Instructional Materials Used/Given: pre- op booklet,verbal  Instruction.    La Jesus, RN, BSN

## 2020-08-06 NOTE — PATIENT INSTRUCTIONS
1. Patient is scheduled for surgery on Monday 8/10/20  2. Patient to schedule a pre-op physical with their primary MD within 30 days of the procedure.   3. Patient to avoid blood thinning medications 1 week prior to surgery (Ibuprofen, Aleve, Aspirin, etc.)   4. Patient to review contents within the surgical packet & use the antiseptic scrub as directed.   5. Patient to call the ENT clinic with further questions or concerns: [phone]

## 2020-08-07 RX ORDER — CLINDAMYCIN PHOSPHATE 900 MG/50ML
900 INJECTION, SOLUTION INTRAVENOUS
Status: CANCELLED | OUTPATIENT
Start: 2020-08-07

## 2020-08-07 RX ORDER — DEXAMETHASONE SODIUM PHOSPHATE 4 MG/ML
10 INJECTION, SOLUTION INTRA-ARTICULAR; INTRALESIONAL; INTRAMUSCULAR; INTRAVENOUS; SOFT TISSUE ONCE
Status: CANCELLED | OUTPATIENT
Start: 2020-08-07 | End: 2020-08-07

## 2020-08-07 RX ORDER — CLINDAMYCIN PHOSPHATE 900 MG/50ML
900 INJECTION, SOLUTION INTRAVENOUS SEE ADMIN INSTRUCTIONS
Status: CANCELLED | OUTPATIENT
Start: 2020-08-07

## 2020-08-09 ENCOUNTER — ANESTHESIA EVENT (OUTPATIENT)
Dept: SURGERY | Facility: CLINIC | Age: 70
End: 2020-08-09
Payer: MEDICARE

## 2020-08-09 ASSESSMENT — LIFESTYLE VARIABLES: TOBACCO_USE: 1

## 2020-08-09 ASSESSMENT — COPD QUESTIONNAIRES: COPD: 1

## 2020-08-09 NOTE — ANESTHESIA PREPROCEDURE EVALUATION
"Anesthesia Pre-Procedure Evaluation    Patient: Goldy Garvin   MRN:     [id number] Gender:   male   Age:    70 year old :      1950        Preoperative Diagnosis: Neck mass [R22.1]   Procedure(s):  Right neck mass excision  laryngoscopy with co2 laser excision     LABS:  CBC:   Lab Results   Component Value Date    WBC 7.7 2020    WBC 7.8 2013    HGB 15.8 2020    HGB 15.6 2013    HCT 45.7 2020    HCT 41.4 2013     2020     2013     BMP:   Lab Results   Component Value Date     2013     2013    POTASSIUM 4.1 2013    POTASSIUM 3.9 2013    CHLORIDE 102 2013    CHLORIDE 105 2013    CO2 29 2013    CO2 30 2013    BUN 20 2013    BUN 13 2013    CR 0.67 2013    CR 0.69 2013     (H) 2013     (H) 2013     COAGS:   Lab Results   Component Value Date    INR 1.08 2020     POC:   Lab Results   Component Value Date     (H) 2013     OTHER:   Lab Results   Component Value Date    CESAR 9.3 2013    PHOS 3.3 2013    MAG 2.2 2013    TSH 1.01 2013        Preop Vitals    BP Readings from Last 3 Encounters:   20 116/66   20 123/77   14 138/80    Pulse Readings from Last 3 Encounters:   20 58   13 65   13 66      Resp Readings from Last 3 Encounters:   20 16   20 19   14 18    SpO2 Readings from Last 3 Encounters:   20 95%   20 (!) 9%   14 96%      Temp Readings from Last 1 Encounters:   20 36.5  C (97.7  F) (Oral)    Ht Readings from Last 1 Encounters:   20 1.676 m (5' 6\")      Wt Readings from Last 1 Encounters:   20 90.7 kg (200 lb)    Estimated body mass index is 32.28 kg/m  as calculated from the following:    Height as of 20: 1.676 m (5' 6\").    Weight as of 20: 90.7 kg (200 lb).     LDA:  Gastrostomy/Enterostomy " Gastrostomy 18 fr replaced existing 18fr G tube (Active)   Number of days: 2504        Past Medical History:   Diagnosis Date     Arthritis     in hands     Cancer (H)      COPD (chronic obstructive pulmonary disease) (H)      Coronary artery disease      Gastro-oesophageal reflux disease      Heart attack (H) 6/2002     History of radiation therapy      Hypertension      Migraines      Tinnitus       Past Surgical History:   Procedure Laterality Date     BIOPSY  3/2013    larynx     CARDIAC SURGERY      stent placed     COLONOSCOPY      2012     HERNIA REPAIR       IR LYMPH NODE BIOPSY  7/23/2020     LARYNGECTOMY  4/30/2013    Procedure: LARYNGECTOMY;  Direct Laryngoscopy with Biopsies, Supra-Cricoid with Tracheostomy Tube placement and 12fr nasal feeding tube placement;  Surgeon: Trav Machado MD;  Location: [facility] OR     LARYNGOSCOPY WITH BIOPSY(IES)  4/30/2013    Procedure: LARYNGOSCOPY WITH BIOPSY(IES);;  Surgeon: Trav Machado MD;  Location: [facility] OR     LASER CO2 LARYNGOSCOPY  4/18/2013    Procedure: LASER CO2 LARYNGOSCOPY;  Direct Laryngosocpy, Co2 Excison Laryngeal Lesion , with Biopsies;  Surgeon: Trav Machado MD;  Location: [facility] OR     LASER CO2 LARYNGOSCOPY  8/6/2013    Procedure: LASER CO2 LARYNGOSCOPY;  Suspension Micro Laryngoscopy With CO2 Laser Of Laryngeal Tissue ;  Surgeon: Trav Machado MD;  Location: [facility] OR     LASER CO2 LARYNGOSCOPY  9/24/2013    Procedure: LASER CO2 LARYNGOSCOPY;  Direct Laryngoscopy with Microscope, Co2 Laser Excision Tracheal Granulation Tissue;  Surgeon: Trav Machado MD;  Location: [facility] OR     LASER CO2 LARYNGOSCOPY, COMPLEX  3/20/2014    Procedure: LASER CO2 LARYNGOSCOPY, COMPLEX;  Suspension Micro Laryngoscopy, Diagnostic Laryngoscopy, Co2 Laser Excision of Laryngeal Lesions, Mitomycin application;  Surgeon: Trav Machado MD;  Location: [facility] OR      Allergies   Allergen Reactions     Seafood      Other reaction(s): Angioedema     Blueberry Flavor       Actually allergic to blueberries     Clam Shell      Actually allergic to clams     Amoxicillin Rash        Anesthesia Evaluation     .             ROS/MED HX    ENT/Pulmonary:     (+)tobacco use, Past use COPD, , . .    Neurologic:     (+)migraines,     Cardiovascular:     (+) hypertension--CAD, --stent,. : . . . :. . Previous cardiac testing date:results:date: results:ECG reviewed date: results:Sinus bradycardia date: results:          METS/Exercise Tolerance:  >4 METS   Hematologic:        (-) anemia   Musculoskeletal:   (+) arthritis,  -       GI/Hepatic:     (+) GERD      (-) liver disease   Renal/Genitourinary:      (-) renal disease   Endo:      (-) Type II DM and thyroid disease   Psychiatric:        (-) psychiatric history   Infectious Disease:         Malignancy:   (+) Malignancy           Other:                         PHYSICAL EXAM:   Mental Status/Neuro: A/A/O   Airway: Facies: Feasible  Mallampati: II  Mouth/Opening: Full  TM distance: > 6 cm  Neck ROM: Limited   Respiratory: Auscultation: CTAB     Resp. Rate: Normal     Resp. Effort: Stridor      CV: Rhythm: Regular  Rate: Age appropriate  Heart: Normal Sounds  Edema: None   Comments: Patient with baseline hoarseness and upper airway stridor. Expect difficulty with obtaining airway 2/2 upper airway stenosis     Dental: Normal Dentition                Assessment:   ASA SCORE: 3    H&P: History and physical reviewed and following examination; no interval change.   Smoking Status:  Non-Smoker/Unknown   NPO Status: NPO Appropriate     Plan:   Anes. Type:  General   Pre-Medication: None   Induction:  IV (Standard)   Airway: ETT; Oral   Access/Monitoring: PIV; 2nd PIV; A-Line   Maintenance: Inhalational     Advanced Monitoring: BIS     Postop Plan:   Postop Pain: Opioids  Postop Sedation/Airway: Not planned  Disposition: Inpatient/Admit     PONV Management:   Adult Risk Factors:, Non-Smoker, Postop Opioids   Prevention: Ondansetron, Dexamethasone      CONSENT: Direct conversation   Plan and risks discussed with: Patient   Blood Products: Consented (ALL Blood Products)       Comments for Plan/Consent:  69yo M presents for R neck dissection and laryngoscopy with CO2 laser. Discussed with patient option of superificial cervical plexus block and possible need for awake intubation. Will likely require TIVA during CO2 laser laryngoscopy portion of procedure. Difficult airway equipment in the room at the start of the case.                 Zachariah Steward MD

## 2020-08-10 ENCOUNTER — ANESTHESIA (OUTPATIENT)
Dept: SURGERY | Facility: CLINIC | Age: 70
End: 2020-08-10
Payer: MEDICARE

## 2020-08-10 ENCOUNTER — HOSPITAL ENCOUNTER (OUTPATIENT)
Facility: CLINIC | Age: 70
Discharge: HOME | End: 2020-08-10
Attending: OTOLARYNGOLOGY | Admitting: OTOLARYNGOLOGY
Payer: MEDICARE

## 2020-08-10 VITALS
HEIGHT: 70 IN | OXYGEN SATURATION: 97 % | TEMPERATURE: 98 F | WEIGHT: 195.11 LBS | SYSTOLIC BLOOD PRESSURE: 141 MMHG | DIASTOLIC BLOOD PRESSURE: 85 MMHG | BODY MASS INDEX: 27.93 KG/M2 | RESPIRATION RATE: 16 BRPM

## 2020-08-10 DIAGNOSIS — R22.1 NECK MASS: ICD-10-CM

## 2020-08-10 LAB
CREAT SERPL-MCNC: 1.09 MG/DL (ref 0.66–1.25)
GFR SERPL CREATININE-BSD FRML MDRD: 67 ML/MIN/{1.73_M2}
GLUCOSE BLDC GLUCOMTR-MCNC: 129 MG/DL (ref 70–99)
HGB BLD-MCNC: 17.1 G/DL (ref 13.3–17.7)
POTASSIUM SERPL-SCNC: 3.4 MMOL/L (ref 3.4–5.3)

## 2020-08-10 PROCEDURE — 82565 ASSAY OF CREATININE: CPT | Performed by: OTOLARYNGOLOGY

## 2020-08-10 PROCEDURE — 82962 GLUCOSE BLOOD TEST: CPT

## 2020-08-10 PROCEDURE — 85018 HEMOGLOBIN: CPT | Performed by: OTOLARYNGOLOGY

## 2020-08-10 PROCEDURE — 36415 COLL VENOUS BLD VENIPUNCTURE: CPT | Performed by: OTOLARYNGOLOGY

## 2020-08-10 PROCEDURE — 84132 ASSAY OF SERUM POTASSIUM: CPT | Performed by: OTOLARYNGOLOGY

## 2020-08-10 PROCEDURE — 40000883 ZZH CANCELLED SURGERY UP TO 61-90 MINS: Performed by: OTOLARYNGOLOGY

## 2020-08-10 RX ORDER — LIDOCAINE 40 MG/G
CREAM TOPICAL
Status: DISCONTINUED | OUTPATIENT
Start: 2020-08-10 | End: 2020-08-10 | Stop reason: HOSPADM

## 2020-08-10 RX ORDER — SODIUM CHLORIDE, SODIUM LACTATE, POTASSIUM CHLORIDE, CALCIUM CHLORIDE 600; 310; 30; 20 MG/100ML; MG/100ML; MG/100ML; MG/100ML
INJECTION, SOLUTION INTRAVENOUS CONTINUOUS
Status: DISCONTINUED | OUTPATIENT
Start: 2020-08-10 | End: 2020-08-10 | Stop reason: HOSPADM

## 2020-08-10 ASSESSMENT — MIFFLIN-ST. JEOR: SCORE: 1641.25

## 2020-08-10 NOTE — OR NURSING
Attempted to start pre-operative assessment promptly, however interrupted by provider and pt unable to be completely roomed, delaying care.

## 2020-08-10 NOTE — OR NURSING
Procedure canceled per Dr. Machado; per MD unable to palpate mass and declined to excise possible cyst. Pt given update and instructions to follow up with outpatient clinic as indicated per provider. IV removed and wife updated at the bedside. Face to face time around 60 minutes.

## 2020-08-13 ENCOUNTER — PATIENT OUTREACH (OUTPATIENT)
Dept: OTOLARYNGOLOGY | Facility: CLINIC | Age: 70
End: 2020-08-13

## 2020-08-13 DIAGNOSIS — K11.8 PAROTID MASS: Primary | ICD-10-CM

## 2020-08-13 NOTE — PROGRESS NOTES
Called patient's wife as patient's surgery on Monday was canceled. Dr. Machado suggested to repeat CT neck to evaluate nodule again.     Patient in agreement with plan. He would like to do this locally at the [facility]. CT order placed and faxed to [facility] at [phone].    They will contact patient to arrange CT scan. Wife will contact writer with date and time of scan so we can obtain images for review.     Wife was encouraged to call with any further questions or concerns.     La Jesus, RN, BSN

## 2020-08-17 NOTE — PROGRESS NOTES
Patient was advised of results/recommendations. Will call back to schedule OB U/S week of 8/31/2020.     Pt seen for brief allied health visit. Pt reporting lump in his throat when he swallows to MD. Pt is about 5 years out from radiation to the larynx. Reports no increased coughing but feels a lump when he swallows. Recommend Video Swallow study to further assess swallow function and visualize pharyngeal/upper esophagus swallow function. Pt reports he will follow up for video swallow study as recommended. Time spent with patient 7 minutes.

## 2020-08-20 ENCOUNTER — TELEPHONE (OUTPATIENT)
Dept: OTOLARYNGOLOGY | Facility: CLINIC | Age: 70
End: 2020-08-20

## 2020-08-20 NOTE — TELEPHONE ENCOUNTER
Writer called patient and his wife answered. She states she speaks on his behalf. She states he has his CT scheduled is Jos 8/26/2020 at 0800.     She states images are being done at [facility],    Janice Gomes LPN

## 2020-08-26 ENCOUNTER — TELEPHONE (OUTPATIENT)
Dept: OTOLARYNGOLOGY | Facility: CLINIC | Age: 70
End: 2020-08-26

## 2020-08-26 ENCOUNTER — TRANSFERRED RECORDS (OUTPATIENT)
Dept: HEALTH INFORMATION MANAGEMENT | Facility: CLINIC | Age: 70
End: 2020-08-26

## 2020-08-26 NOTE — TELEPHONE ENCOUNTER
Records Requested  08/26/20    Facility  [facility]    [address] [place] [address]    [phone]     Outcome 8/26/2020 CT Neck report in Care Everywhere, called Viola and left a message with film room (Genesis hernandez rimages to be pushed to Portal PACS - images in PACs

## 2020-08-28 ENCOUNTER — PATIENT OUTREACH (OUTPATIENT)
Dept: OTOLARYNGOLOGY | Facility: CLINIC | Age: 70
End: 2020-08-28

## 2020-08-28 DIAGNOSIS — K11.8 PAROTID MASS: Primary | ICD-10-CM

## 2020-08-28 NOTE — TELEPHONE ENCOUNTER
Called and spoke with patient's wife to let her know that patient's CT neck results were received and reviewed with Dr. Machado. The report states that the mass in the deep parotid lobe on the right is decreased in size in comparison to last scan. It was previously 1.7 X 1.1cm and now is measuring 1.4X 0.7cm.      Dr. Machado is not concerned about this as it has now shown decrease in size. He would like to repeat scan in 2-3 months to ensure stability. Patient's wife in agreement with this plan. We will have schedulers call patient to arrange follow-up in clinic with repeat CT neck in 2-3 months. Wife was encouraged to call sooner with any new or worsening symptoms or any further questions or concerns. She has direct line for return call if needed.     La Jesus, RN, BSN

## 2020-08-31 ENCOUNTER — TELEPHONE (OUTPATIENT)
Dept: OTOLARYNGOLOGY | Facility: CLINIC | Age: 70
End: 2020-08-31

## 2020-10-21 ENCOUNTER — TELEPHONE (OUTPATIENT)
Dept: OTOLARYNGOLOGY | Facility: CLINIC | Age: 70
End: 2020-10-21

## 2021-01-15 ENCOUNTER — HEALTH MAINTENANCE LETTER (OUTPATIENT)
Age: 71
End: 2021-01-15

## 2021-01-29 ENCOUNTER — TELEPHONE (OUTPATIENT)
Dept: OTOLARYNGOLOGY | Facility: CLINIC | Age: 71
End: 2021-01-29

## 2021-01-29 NOTE — TELEPHONE ENCOUNTER
[facility] Call Center    Phone Message    May a detailed message be left on voicemail: yes     Reason for Call: Other: The pt's wife is calling to ask questions about what will happen if the pt gets COVID and can't go on a ventilator because of his condition. Please call to discuss with the pt's wife Vianca. Thanks    Action Taken: Message routed to:  [facility] ([facility]): ana ent    Travel Screening: Not Applicable

## 2021-02-04 NOTE — TELEPHONE ENCOUNTER
Returned call and left message for patient's wife with direct line to call back to discuss her questions.     La Jesus, RN, BSN

## 2021-09-04 ENCOUNTER — HEALTH MAINTENANCE LETTER (OUTPATIENT)
Age: 71
End: 2021-09-04

## 2022-02-19 ENCOUNTER — HEALTH MAINTENANCE LETTER (OUTPATIENT)
Age: 72
End: 2022-02-19

## 2022-10-16 ENCOUNTER — HEALTH MAINTENANCE LETTER (OUTPATIENT)
Age: 72
End: 2022-10-16

## 2023-04-01 ENCOUNTER — HEALTH MAINTENANCE LETTER (OUTPATIENT)
Age: 73
End: 2023-04-01

## (undated) DEVICE — TUBING SMOKE EVAC .635CMX3M SEA3705

## (undated) DEVICE — GLOVE PROTEXIS MICRO 7.0  2D73PM70

## (undated) DEVICE — EYE DRSG PAD OVAL

## (undated) DEVICE — ESU CORD BIPOLAR AND IRR TUBING AESCULAP US355

## (undated) DEVICE — DRAPE SLEEVE 599

## (undated) DEVICE — PREP POVIDONE IODINE SCRUB 7.5% 4OZ APL82212

## (undated) DEVICE — GLOVE PROTEXIS MICRO 7.5  2D73PM75

## (undated) DEVICE — RETR ELASTIC STAYS LONE STAR BLUNT DUAL LEAD 3550-1G

## (undated) DEVICE — WIPE PREMOIST CLEANSING WASHCLOTHS 7988

## (undated) DEVICE — ESU PENCIL SMOKE EVAC W/ROCKER SWITCH 0703-047-000

## (undated) DEVICE — SU SILK 4-0 TIE 12X30" A303H

## (undated) DEVICE — SPONGE KITTNER 30-101

## (undated) DEVICE — SUCTION TIP YANKAUER W/O VENT K86

## (undated) DEVICE — ESU ELEC BLADE 2.75" COATED/INSULATED E1455

## (undated) DEVICE — SU CHROMIC 4-0 M-1DA 744G

## (undated) DEVICE — SU SILK 2-0 SH CR 5X18" C0125

## (undated) DEVICE — PACK ENT ENDOSCOPY UMMC

## (undated) DEVICE — ESU HARMONIC FOCUS SHEARS CVD 9CM HAR9F

## (undated) DEVICE — SU SILK 3-0 TIE 12X30" A304H

## (undated) DEVICE — VESSEL LOOPS YELLOW MAXI 31145694

## (undated) DEVICE — SU VICRYL 3-0 SH 8X18" UND J864D

## (undated) DEVICE — SU SILK 2-0 TIE 12X30" A305H

## (undated) DEVICE — PAD CHUX UNDERPAD 23X24" 7136

## (undated) DEVICE — PREP SKIN SCRUB TRAY 4461A

## (undated) DEVICE — PREP POVIDONE IODINE SOLUTION 10% 4OZ

## (undated) DEVICE — SU ETHILON 5-0 PC-3 18" 1865G

## (undated) DEVICE — DRSG PRIMAPORE 02X3" 7133

## (undated) DEVICE — SUCTION MANIFOLD DORNOCH ULTRA CART UL-CL500

## (undated) DEVICE — LINEN TOWEL PACK X5 5464

## (undated) DEVICE — SU ETHILON 4-0 P-3 18" BLACK 699G

## (undated) DEVICE — SOL NACL 0.9% IRRIG 1000ML BOTTLE 2F7124

## (undated) DEVICE — CLIP HORIZON MED BLUE 002200

## (undated) DEVICE — SOL WATER IRRIG 1000ML BOTTLE 2F7114

## (undated) DEVICE — SU SILK 0 TIE 6X30" A306H

## (undated) DEVICE — ESU GROUND PAD ADULT W/CORD E7507

## (undated) DEVICE — SPONGE COTTONOID 1/2X1/2" 20-04S

## (undated) DEVICE — SYR PISTON URETHRAL 60ML 68000

## (undated) DEVICE — SUCTION SLEEVE NEPTUNE 2 125MM 0703-005-125

## (undated) DEVICE — CLIP HORIZON SM RED WIDE SLOT 001201

## (undated) DEVICE — SPONGE COTTONOID 1/2X1" 20-05S

## (undated) DEVICE — PACK NEURO MINOR UMMC SNE32MNMU4

## (undated) DEVICE — BLADE KNIFE SURG 15 371115

## (undated) RX ORDER — LIDOCAINE HYDROCHLORIDE 10 MG/ML
INJECTION, SOLUTION EPIDURAL; INFILTRATION; INTRACAUDAL; PERINEURAL
Status: DISPENSED
Start: 2020-07-23

## (undated) RX ORDER — FENTANYL CITRATE 50 UG/ML
INJECTION, SOLUTION INTRAMUSCULAR; INTRAVENOUS
Status: DISPENSED
Start: 2020-08-10

## (undated) RX ORDER — LIDOCAINE HYDROCHLORIDE 20 MG/ML
INJECTION, SOLUTION EPIDURAL; INFILTRATION; INTRACAUDAL; PERINEURAL
Status: DISPENSED
Start: 2020-08-10

## (undated) RX ORDER — PROPOFOL 10 MG/ML
INJECTION, EMULSION INTRAVENOUS
Status: DISPENSED
Start: 2020-08-10

## (undated) RX ORDER — OXYMETAZOLINE HYDROCHLORIDE 0.05 G/100ML
SPRAY NASAL
Status: DISPENSED
Start: 2020-08-10

## (undated) RX ORDER — SODIUM CHLORIDE 9 MG/ML
INJECTION, SOLUTION INTRAVENOUS
Status: DISPENSED
Start: 2020-07-23

## (undated) RX ORDER — FENTANYL CITRATE 50 UG/ML
INJECTION, SOLUTION INTRAMUSCULAR; INTRAVENOUS
Status: DISPENSED
Start: 2020-07-23